# Patient Record
Sex: FEMALE | Race: BLACK OR AFRICAN AMERICAN | Employment: PART TIME | URBAN - METROPOLITAN AREA
[De-identification: names, ages, dates, MRNs, and addresses within clinical notes are randomized per-mention and may not be internally consistent; named-entity substitution may affect disease eponyms.]

---

## 2017-01-03 ENCOUNTER — OFFICE VISIT (OUTPATIENT)
Dept: ORTHOPEDIC SURGERY | Age: 27
End: 2017-01-03

## 2017-01-03 VITALS
HEIGHT: 64 IN | RESPIRATION RATE: 16 BRPM | BODY MASS INDEX: 32.95 KG/M2 | DIASTOLIC BLOOD PRESSURE: 85 MMHG | SYSTOLIC BLOOD PRESSURE: 130 MMHG | WEIGHT: 193 LBS | TEMPERATURE: 98 F | HEART RATE: 90 BPM | OXYGEN SATURATION: 98 %

## 2017-01-03 DIAGNOSIS — M99.03 LUMBAR REGION SOMATIC DYSFUNCTION: ICD-10-CM

## 2017-01-03 DIAGNOSIS — G89.29 CHRONIC LEFT-SIDED LOW BACK PAIN WITHOUT SCIATICA: Primary | ICD-10-CM

## 2017-01-03 DIAGNOSIS — M54.42 ACUTE BILATERAL LOW BACK PAIN WITH LEFT-SIDED SCIATICA: ICD-10-CM

## 2017-01-03 DIAGNOSIS — M99.04 SACRAL REGION SOMATIC DYSFUNCTION: ICD-10-CM

## 2017-01-03 DIAGNOSIS — M54.50 CHRONIC LEFT-SIDED LOW BACK PAIN WITHOUT SCIATICA: Primary | ICD-10-CM

## 2017-01-03 DIAGNOSIS — M99.05 PELVIC SOMATIC DYSFUNCTION: ICD-10-CM

## 2017-01-03 RX ORDER — CYCLOBENZAPRINE HCL 5 MG
TABLET ORAL
Qty: 15 TAB | Refills: 0 | Status: SHIPPED | OUTPATIENT
Start: 2017-01-03 | End: 2017-01-03 | Stop reason: SDUPTHER

## 2017-01-03 RX ORDER — CYCLOBENZAPRINE HCL 5 MG
TABLET ORAL
Qty: 90 TAB | Refills: 0 | Status: SHIPPED | OUTPATIENT
Start: 2017-01-03 | End: 2017-07-18

## 2017-01-03 RX ORDER — MELOXICAM 7.5 MG/1
TABLET ORAL
Qty: 30 TAB | Refills: 0 | Status: SHIPPED | OUTPATIENT
Start: 2017-01-03 | End: 2017-01-03 | Stop reason: SDUPTHER

## 2017-01-03 RX ORDER — HYDROCODONE BITARTRATE AND ACETAMINOPHEN 5; 325 MG/1; MG/1
1 TABLET ORAL
Qty: 30 TAB | Refills: 0 | Status: SHIPPED | OUTPATIENT
Start: 2017-01-03 | End: 2017-11-20

## 2017-01-03 RX ORDER — MELOXICAM 7.5 MG/1
TABLET ORAL
Qty: 30 TAB | Refills: 0 | Status: SHIPPED | OUTPATIENT
Start: 2017-01-03 | End: 2017-01-26 | Stop reason: SDUPTHER

## 2017-01-03 NOTE — PROGRESS NOTES
1. Have you been to the ER, urgent care clinic since your last visit? Hospitalized since your last visit? new to  practice    2. Have you seen or consulted any other health care providers outside of the [address] [place] [address] since your last visit? Include any pap smears or colon screening.   new to  practice

## 2017-01-03 NOTE — MR AVS SNAPSHOT
Visit Information Date & Time Provider Department Dept. Phone Encounter #  
 1/3/2017  3:40 PM Andrea Raygoza, [address] [phone] [id number] Follow-up Instructions Return in about 6 weeks (around 2/14/2017) for lumbago. Upcoming Health Maintenance Date Due DTaP/Tdap/Td series (1 - Tdap) 4/9/2011 HPV AGE 9Y-26Y (2 of 3 - Female 3 Dose Series) 5/18/2050* PAP AKA CERVICAL CYTOLOGY 1/27/2018 *Topic was postponed. The date shown is not the original due date. Allergies as of 1/3/2017  Review Complete On: 1/3/2017 By: Andrea Raygoza, DO No Known Allergies Current Immunizations  Reviewed on 4/20/2016 Name Date Influenza Vaccine PF 10/3/2016 Not reviewed this visit You Were Diagnosed With   
  
 Codes Comments Chronic left-sided low back pain without sciatica    -  Primary ICD-10-CM: M54.5, G89.29 ICD-9-CM: 724.2, 338.29 Lumbar region somatic dysfunction     ICD-10-CM: M99.03 
ICD-9-CM: 739.3 Pelvic somatic dysfunction     ICD-10-CM: M99.05 
ICD-9-CM: 739.5 Sacral region somatic dysfunction     ICD-10-CM: M99.04 
ICD-9-CM: 739.4 Vitals BP Pulse Temp Resp Height(growth percentile) Weight(growth percentile) 130/85 (BP 1 Location: Right arm, BP Patient Position: Sitting) 90 98 °F (36.7 °C) (Oral) 16 5' 4\" (1.626 m) 193 lb (87.5 kg) SpO2 BMI OB Status Smoking Status 98% 33.13 kg/m2 Injection Never Smoker Vitals History BMI and BSA Data Body Mass Index Body Surface Area  
 33.13 kg/m 2 1.99 m 2 Preferred Pharmacy Pharmacy Name Phone [facility][id number]- [address] [phone] Your Updated Medication List  
  
   
This list is accurate as of: 1/3/17  3:44 PM.  Always use your most recent med list.  
  
  
  
  
 butalbital-acetaminophen-caffeine -40 mg per tablet Commonly known as:  Julio Paul  
 Take 1 Tab by mouth every eight (8) hours as needed for Headache. Max Daily Amount: 3 Tabs. cyclobenzaprine 5 mg tablet Commonly known as:  FLEXERIL Take one po qhs prn muscle spasms HYDROcodone-acetaminophen 5-325 mg per tablet Commonly known as:  Chanda Luis Take 1 Tab by mouth every eight (8) hours as needed for Pain. Max Daily Amount: 3 Tabs.  
  
 loratadine 10 mg tablet Commonly known as:  Benoit Sor Take 1 Tab by mouth daily. meloxicam 7.5 mg tablet Commonly known as:  MOBIC Take 1 po twice daily prn pain  
  
 rizatriptan 5 mg tablet Commonly known as:  Verenice Prier Take one po at onset of headaches and May repeat in 2 hours if needed for maximum of 4 in 24 hrs  
  
 sertraline 25 mg tablet Commonly known as:  ZOLOFT Take 1 Tab by mouth daily. Prescriptions Printed Refills HYDROcodone-acetaminophen (NORCO) 5-325 mg per tablet 0 Sig: Take 1 Tab by mouth every eight (8) hours as needed for Pain. Max Daily Amount: 3 Tabs. Class: Print Route: Oral  
  
Prescriptions Sent to Pharmacy Refills  
 meloxicam (MOBIC) 7.5 mg tablet 0 Sig: Take 1 po twice daily prn pain  
 Class: Normal  
 Pharmacy: [facility] Ph #: [phone] cyclobenzaprine (FLEXERIL) 5 mg tablet 0 Sig: Take one po qhs prn muscle spasms Class: Normal  
 Pharmacy: [address] [place] [address] Ph #: [phone] Follow-up Instructions Return in about 6 weeks (around 2/14/2017) for lumbago. Referral Information Referral ID Referred By Referred To  
  
 [id number] [facility] [address] [place] [address], [address] Phone: [phone] Visits Status Start Date End Date 1 New Request 1/3/17 1/3/18  If your referral has a status of pending review or denied, additional information will be sent to support the outcome of this decision. Patient Instructions Back Pain: Care Instructions Your Care Instructions Back pain has many possible causes. It is often related to problems with muscles and ligaments of the back. It may also be related to problems with the nerves, discs, or bones of the back. Moving, lifting, standing, sitting, or sleeping in an awkward way can strain the back. Sometimes you don't notice the injury until later. Arthritis is another common cause of back pain. Although it may hurt a lot, back pain usually improves on its own within several weeks. Most people recover in 12 weeks or less. Using good home treatment and being careful not to stress your back can help you feel better sooner. Follow-up care is a key part of your treatment and safety. Be sure to make and go to all appointments, and call your doctor if you are having problems. Its also a good idea to know your test results and keep a list of the medicines you take. How can you care for yourself at home? · Sit or lie in positions that are most comfortable and reduce your pain. Try one of these positions when you lie down: ¨ Lie on your back with your knees bent and supported by large pillows. ¨ Lie on the floor with your legs on the seat of a sofa or chair. Berhane Hutton on your side with your knees and hips bent and a pillow between your legs. ¨ Lie on your stomach if it does not make pain worse. · Do not sit up in bed, and avoid soft couches and twisted positions. Bed rest can help relieve pain at first, but it delays healing. Avoid bed rest after the first day of back pain. · Change positions every 30 minutes. If you must sit for long periods of time, take breaks from sitting. Get up and walk around, or lie in a comfortable position. · Try using a heating pad on a low or medium setting for 15 to 20 minutes every 2 or 3 hours.  Try a warm shower in place of one session with the heating pad. · You can also try an ice pack for 10 to 15 minutes every 2 to 3 hours. Put a thin cloth between the ice pack and your skin. · Take pain medicines exactly as directed. ¨ If the doctor gave you a prescription medicine for pain, take it as prescribed. ¨ If you are not taking a prescription pain medicine, ask your doctor if you can take an over-the-counter medicine. · Take short walks several times a day. You can start with 5 to 10 minutes, 3 or 4 times a day, and work up to longer walks. Walk on level surfaces and avoid hills and stairs until your back is better. · Return to work and other activities as soon as you can. Continued rest without activity is usually not good for your back. · To prevent future back pain, do exercises to stretch and strengthen your back and stomach. Learn how to use good posture, safe lifting techniques, and proper body mechanics. When should you call for help? Call your doctor now or seek immediate medical care if: 
· You have new or worsening numbness in your legs. · You have new or worsening weakness in your legs. (This could make it hard to stand up.) · You lose control of your bladder or bowels. Watch closely for changes in your health, and be sure to contact your doctor if: 
· Your pain gets worse. · You are not getting better after 2 weeks. Where can you learn more? Go to http://adela-navdeep.info/. Enter N385 in the search box to learn more about \"Back Pain: Care Instructions. \" Current as of: May 23, 2016 Content Version: 11.1 © 9556-6125 [employer]. Care instructions adapted under license by [employer] (which disclaims liability or warranty for this information). If you have questions about a medical condition or this instruction, always ask your healthcare professional. [place] [address] any warranty or liability for your use of this information. Introducing [facility]! Bharath Chauhan introduces Placeling patient portal. Now you can access parts of your medical record, email your doctor's office, and request medication refills online. 1. In your internet browser, go to [email or web address]. [email or web address] 2. Click on the First Time User? Click Here link in the Sign In box. You will see the New Member Sign Up page. 3. Enter your Placeling Access Code exactly as it appears below. You will not need to use this code after youve completed the sign-up process. If you do not sign up before the expiration date, you must request a new code. · Placeling Access Code: [email or web address] Expires: 4/3/2017  3:11 PM 
 
4. Enter the last four digits of your Social Security Number (xxxx) and Date of Birth (mm/dd/yyyy) as indicated and click Submit. You will be taken to the next sign-up page. 5. Create a Placeling ID. This will be your Placeling login ID and cannot be changed, so think of one that is secure and easy to remember. 6. Create a Placeling password. You can change your password at any time. 7. Enter your Password Reset Question and Answer. This can be used at a later time if you forget your password. 8. Enter your e-mail address. You will receive e-mail notification when new information is available in [address] 9. Click Sign Up. You can now view and download portions of your medical record. 10. Click the Download Summary menu link to download a portable copy of your medical information. If you have questions, please visit the Frequently Asked Questions section of the Placeling website. Remember, Placeling is NOT to be used for urgent needs. For medical emergencies, dial 911. Now available from your iPhone and Android! Please provide this summary of care documentation to your next provider. Your primary care clinician is listed as Ricardo Latif. If you have any questions after today's visit, please call [phone].
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
## 2017-01-03 NOTE — TELEPHONE ENCOUNTER
Requested Prescriptions     Pending Prescriptions Disp Refills    cyclobenzaprine (FLEXERIL) 5 mg tablet 15 Tab 0     Sig: Take one po qhs prn muscle spasms    meloxicam (MOBIC) 7.5 mg tablet 30 Tab 0     Sig: Take 1-2 po daily prn pain

## 2017-01-03 NOTE — PATIENT INSTRUCTIONS
Back Pain: Care Instructions  Your Care Instructions    Back pain has many possible causes. It is often related to problems with muscles and ligaments of the back. It may also be related to problems with the nerves, discs, or bones of the back. Moving, lifting, standing, sitting, or sleeping in an awkward way can strain the back. Sometimes you don't notice the injury until later. Arthritis is another common cause of back pain. Although it may hurt a lot, back pain usually improves on its own within several weeks. Most people recover in 12 weeks or less. Using good home treatment and being careful not to stress your back can help you feel better sooner. Follow-up care is a key part of your treatment and safety. Be sure to make and go to all appointments, and call your doctor if you are having problems. Its also a good idea to know your test results and keep a list of the medicines you take. How can you care for yourself at home? · Sit or lie in positions that are most comfortable and reduce your pain. Try one of these positions when you lie down:  ¨ Lie on your back with your knees bent and supported by large pillows. ¨ Lie on the floor with your legs on the seat of a sofa or chair. Phill Maw on your side with your knees and hips bent and a pillow between your legs. ¨ Lie on your stomach if it does not make pain worse. · Do not sit up in bed, and avoid soft couches and twisted positions. Bed rest can help relieve pain at first, but it delays healing. Avoid bed rest after the first day of back pain. · Change positions every 30 minutes. If you must sit for long periods of time, take breaks from sitting. Get up and walk around, or lie in a comfortable position. · Try using a heating pad on a low or medium setting for 15 to 20 minutes every 2 or 3 hours. Try a warm shower in place of one session with the heating pad. · You can also try an ice pack for 10 to 15 minutes every 2 to 3 hours.  Put a thin cloth between the ice pack and your skin. · Take pain medicines exactly as directed. ¨ If the doctor gave you a prescription medicine for pain, take it as prescribed. ¨ If you are not taking a prescription pain medicine, ask your doctor if you can take an over-the-counter medicine. · Take short walks several times a day. You can start with 5 to 10 minutes, 3 or 4 times a day, and work up to longer walks. Walk on level surfaces and avoid hills and stairs until your back is better. · Return to work and other activities as soon as you can. Continued rest without activity is usually not good for your back. · To prevent future back pain, do exercises to stretch and strengthen your back and stomach. Learn how to use good posture, safe lifting techniques, and proper body mechanics. When should you call for help? Call your doctor now or seek immediate medical care if:  · You have new or worsening numbness in your legs. · You have new or worsening weakness in your legs. (This could make it hard to stand up.)  · You lose control of your bladder or bowels. Watch closely for changes in your health, and be sure to contact your doctor if:  · Your pain gets worse. · You are not getting better after 2 weeks. Where can you learn more? Go to http://adela-navdeep.info/. Enter H858 in the search box to learn more about \"Back Pain: Care Instructions. \"  Current as of: May 23, 2016  Content Version: 11.1  © 4469-3301 [employer], Incorporated. Care instructions adapted under license by [employer] (which disclaims liability or warranty for this information). If you have questions about a medical condition or this instruction, always ask your healthcare professional. [address] any warranty or liability for your use of this information.

## 2017-05-09 DIAGNOSIS — F41.9 ANXIETY: ICD-10-CM

## 2017-05-09 RX ORDER — SERTRALINE HYDROCHLORIDE 25 MG/1
25 TABLET, FILM COATED ORAL DAILY
Qty: 30 TAB | Refills: 0 | Status: SHIPPED | OUTPATIENT
Start: 2017-05-09 | End: 2017-07-18 | Stop reason: SDUPTHER

## 2017-05-09 NOTE — TELEPHONE ENCOUNTER
Requested Prescriptions     Pending Prescriptions Disp Refills    sertraline (ZOLOFT) 25 mg tablet 90 Tab 3     Sig: Take 1 Tab by mouth daily.

## 2017-05-09 NOTE — TELEPHONE ENCOUNTER
Sent electronically:    Requested Prescriptions     Signed Prescriptions Disp Refills    sertraline (ZOLOFT) 25 mg tablet 30 Tab 0     Sig: Take 1 Tab by mouth daily. Authorizing Provider: Roxanne Solorzano     Pt needs to make a f/u appt. With me if she wants more refills.

## 2017-06-23 DIAGNOSIS — F41.9 ANXIETY: ICD-10-CM

## 2017-06-25 RX ORDER — SERTRALINE HYDROCHLORIDE 25 MG/1
TABLET, FILM COATED ORAL
Qty: 30 TAB | Refills: 0 | OUTPATIENT
Start: 2017-06-25

## 2017-06-25 NOTE — TELEPHONE ENCOUNTER
Denied:    Requested Prescriptions     Refused Prescriptions Disp Refills    sertraline (ZOLOFT) 25 mg tablet [Pharmacy Med Name: SERTRALINE 25MG     TAB] 30 Tab 0     Sig: TAKE ONE TABLET BY MOUTH ONCE DAILY     Refused By: Maximo Finder     Reason for Refusal: Appt required, please call patient     Last OV with me was 12/2016. She needs to be seen.

## 2017-06-26 NOTE — TELEPHONE ENCOUNTER
Spoke with patient, confirmed name and . Advised patient of required appointment, per Dr. Ariel Ingram. Patient verbalized understanding, scheduled an appointment for 2017 at 945.      Be advised

## 2017-07-18 ENCOUNTER — OFFICE VISIT (OUTPATIENT)
Dept: INTERNAL MEDICINE CLINIC | Age: 27
End: 2017-07-18

## 2017-07-18 VITALS
WEIGHT: 207.4 LBS | SYSTOLIC BLOOD PRESSURE: 120 MMHG | HEART RATE: 88 BPM | RESPIRATION RATE: 16 BRPM | DIASTOLIC BLOOD PRESSURE: 80 MMHG | OXYGEN SATURATION: 100 % | HEIGHT: 64 IN | BODY MASS INDEX: 35.41 KG/M2 | TEMPERATURE: 97.5 F

## 2017-07-18 DIAGNOSIS — G89.29 CHRONIC LEFT-SIDED LOW BACK PAIN WITHOUT SCIATICA: ICD-10-CM

## 2017-07-18 DIAGNOSIS — R42 DIZZINESS: Primary | ICD-10-CM

## 2017-07-18 DIAGNOSIS — R09.81 NASAL CONGESTION: ICD-10-CM

## 2017-07-18 DIAGNOSIS — F41.9 ANXIETY: ICD-10-CM

## 2017-07-18 DIAGNOSIS — F32.A DEPRESSION, UNSPECIFIED DEPRESSION TYPE: ICD-10-CM

## 2017-07-18 DIAGNOSIS — M54.50 CHRONIC LEFT-SIDED LOW BACK PAIN WITHOUT SCIATICA: ICD-10-CM

## 2017-07-18 RX ORDER — CYCLOBENZAPRINE HCL 5 MG
TABLET ORAL
Qty: 90 TAB | Refills: 0 | Status: SHIPPED | OUTPATIENT
Start: 2017-07-18 | End: 2018-03-15 | Stop reason: SDUPTHER

## 2017-07-18 RX ORDER — FLUTICASONE PROPIONATE 50 MCG
1 SPRAY, SUSPENSION (ML) NASAL DAILY
Qty: 1 BOTTLE | Refills: 3 | Status: SHIPPED | OUTPATIENT
Start: 2017-07-18 | End: 2018-04-25

## 2017-07-18 RX ORDER — SERTRALINE HYDROCHLORIDE 25 MG/1
25 TABLET, FILM COATED ORAL DAILY
Qty: 90 TAB | Refills: 3 | Status: SHIPPED | OUTPATIENT
Start: 2017-07-18 | End: 2018-04-25

## 2017-07-18 NOTE — PATIENT INSTRUCTIONS
1) follow-up in 4 months or sooner if worsening symptoms. DR. Ludmila Quiroz    External Physician           Coleen GODINEZSE, [address], [facility], [address] [facility]       Primary Contact Information      Phone Fax E-mail Address     [phone] Not available. [address]       Dr. Artie Packer Psychiatry       Primary Contact Information      Phone Fax E-mail Address     [phone] Not available.  [address] [place] [address]

## 2017-07-18 NOTE — PROGRESS NOTES
ROOM # 1    Clyde Vergara presents today for   Chief Complaint   Patient presents with    Dizziness     dizziness and near syncope    Nasal Congestion     intermittent congestion and runny nose    Medication Refill     Requested Prescriptions     Pending Prescriptions Disp Refills    sertraline (ZOLOFT) 25 mg tablet 30 Tab 0     Sig: Take 1 Tab by mouth daily.  cyclobenzaprine (FLEXERIL) 5 mg tablet 90 Tab 0     Sig: Take one po qhs prn muscle spasms         Hannah Burt preferred language for health care discussion is english/other. Is someone accompanying this pt? no    Is the patient using any DME equipment during OV? no    Depression Screening:  PHQ over the last two weeks 4/20/2016 4/20/2016 4/9/2015   Little interest or pleasure in doing things Not at all Not at all Not at all   Feeling down, depressed or hopeless Not at all Not at all Not at all   Total Score PHQ 2 0 0 0       Learning Assessment:  Learning Assessment 6/10/2015 2/11/2015   PRIMARY LEARNER Patient Patient   HIGHEST LEVEL OF EDUCATION - PRIMARY LEARNER  - SOME COLLEGE   BARRIERS PRIMARY LEARNER - NONE   PRIMARY LANGUAGE ENGLISH ENGLISH   LEARNER PREFERENCE PRIMARY LISTENING DEMONSTRATION   ANSWERED BY patient Patient   RELATIONSHIP SELF SELF       Abuse Screening:  No flowsheet data found. Fall Risk  No flowsheet data found. Health Maintenance reviewed and discussed per provider. Yes    Clyde Vergara is due for TDAP vax. Please order/place referral if appropriate. Advance Directive:  1. Do you have an advance directive in place? Patient Reply: no    2. If not, would you like material regarding how to put one in place? Patient Reply: no    Coordination of Care:  1. Have you been to the ER, urgent care clinic since your last visit? Hospitalized since your last visit? 1 month ago [facility] urgent care for strep throat    2.  Have you seen or consulted any other health care providers outside of the [facility] since your last visit? Include any pap smears or colon screening.  no

## 2017-07-18 NOTE — LETTER
NOTIFICATION RETURN TO WORK / SCHOOL 
 
7/18/2017 10:31 AM 
 
Ms. Jen Nails [address] [facility] To Whom It May Concern: 
 
Jen Nails is currently under the care of Elle Bowens. She will return to work on 7/19/17. If there are questions or concerns please have the patient contact our office. Sincerely, Radha Guevara MD

## 2017-07-18 NOTE — MR AVS SNAPSHOT
Visit Information Date & Time Provider Department Dept. Phone Encounter #  
 7/18/2017 10:00 AM Melanie Sewell MD [address] [place] [address] [phone] [id number] Follow-up Instructions Return in about 4 months (around 11/18/2017) for f/u anxiety. Upcoming Health Maintenance Date Due DTaP/Tdap/Td series (1 - Tdap) 4/9/2011 INFLUENZA AGE 9 TO ADULT 8/1/2017 PAP AKA CERVICAL CYTOLOGY 1/27/2018 Allergies as of 7/18/2017  Review Complete On: 7/18/2017 By: Melanie Sewell MD  
 No Known Allergies Current Immunizations  Reviewed on 4/20/2016 Name Date Influenza Vaccine PF 10/3/2016 Not reviewed this visit You Were Diagnosed With   
  
 Codes Comments Dizziness    -  Primary ICD-10-CM: M21 ICD-9-CM: 780.4 Anxiety     ICD-10-CM: F41.9 ICD-9-CM: 300.00 Chronic left-sided low back pain without sciatica     ICD-10-CM: M54.5, G89.29 ICD-9-CM: 724.2, 338.29 Vitals BP Pulse Temp Resp Height(growth percentile) Weight(growth percentile) 120/80 (BP 1 Location: Left arm, BP Patient Position: Sitting) 88 97.5 °F (36.4 °C) (Oral) 16 5' 4\" (1.626 m) 207 lb 6.4 oz (94.1 kg) SpO2 BMI OB Status Smoking Status 100% 35.6 kg/m2 Injection Never Smoker Vitals History BMI and BSA Data Body Mass Index Body Surface Area  
 35.6 kg/m 2 2.06 m 2 Preferred Pharmacy Pharmacy Name Phone [address] [phone] Your Updated Medication List  
  
   
This list is accurate as of: 7/18/17 10:31 AM.  Always use your most recent med list.  
  
  
  
  
 butalbital-acetaminophen-caffeine -40 mg per tablet Commonly known as:  [place] Take 1 Tab by mouth every eight (8) hours as needed for Headache. Max Daily Amount: 3 Tabs. cyclobenzaprine 5 mg tablet Commonly known as:  FLEXERIL Take one po qhs prn muscle spasms HYDROcodone-acetaminophen 5-325 mg per tablet Commonly known as:  Shanthi Almodovar Take 1 Tab by mouth every eight (8) hours as needed for Pain. Max Daily Amount: 3 Tabs.  
  
 loratadine 10 mg tablet Commonly known as:  Bula Holms Take 1 Tab by mouth daily. meloxicam 7.5 mg tablet Commonly known as:  MOBIC Take 1 po twice daily prn pain  
  
 sertraline 25 mg tablet Commonly known as:  ZOLOFT Take 1 Tab by mouth daily. Prescriptions Sent to Pharmacy Refills  
 sertraline (ZOLOFT) 25 mg tablet 3 Sig: Take 1 Tab by mouth daily. Class: Normal  
 Pharmacy: [address] [place] [address] Ph #: [phone] Route: Oral  
 cyclobenzaprine (FLEXERIL) 5 mg tablet 0 Sig: Take one po qhs prn muscle spasms Class: Normal  
 Pharmacy: [address] [place] [address] Ph #: [phone] Follow-up Instructions Return in about 4 months (around 11/18/2017) for f/u anxiety. Patient Instructions 1) follow-up in 4 months or sooner if worsening symptoms. DR. Lorena Caicedo Physician  
  
  
  [facility]  
  [place], [address] [place] [address] Psychiatry  
   
Primary Contact Information   
  Phone Fax E-mail Address  
  [phone] Not available. [address] [place] [address]    
 
Dr. Stephanie Yoon Physician  
  
  
  Specialty  
  Psychiatry  
   
Primary Contact Information   
  Phone Fax E-mail Address  
  [phone] Not available. Camden Poe  
     
 
 
 
 
  
Introducing [facility]! Katie Schroeder introduces [employer] patient portal. Now you can access parts of your medical record, email your doctor's office, and request medication refills online. 1. In your internet browser, go to [email or web address] 2. Click on the First Time User? Click Here link in the Sign In box. You will see the New Member Sign Up page. 3. Enter your [employer] Access Code exactly as it appears below. You will not need to use this code after youve completed the sign-up process. If you do not sign up before the expiration date, you must request a new code. · [employer] Access Code: [email or web address] Expires: 10/16/2017 10:31 AM 
 
4. Enter the last four digits of your Social Security Number (xxxx) and Date of Birth (mm/dd/yyyy) as indicated and click Submit. You will be taken to the next sign-up page. 5. Create a [employer] ID. This will be your [employer] login ID and cannot be changed, so think of one that is secure and easy to remember. 6. Create a [employer] password. You can change your password at any time. 7. Enter your Password Reset Question and Answer. This can be used at a later time if you forget your password. 8. Enter your e-mail address. You will receive e-mail notification when new information is available in [address] 9. Click Sign Up. You can now view and download portions of your medical record. 10. Click the Download Summary menu link to download a portable copy of your medical information. If you have questions, please visit the Frequently Asked Questions section of the [employer] website. Remember, [employer] is NOT to be used for urgent needs. For medical emergencies, dial 911. Now available from your iPhone and Android! Please provide this summary of care documentation to your next provider. Your primary care clinician is listed as Ricardo Latif. If you have any questions after today's visit, please call [phone].

## 2017-07-18 NOTE — PROGRESS NOTES
Chief Complaint   Patient presents with    Dizziness     dizziness and near syncope    Nasal Congestion     intermittent congestion and runny nose    Medication Refill       HPI:     Janel Diamond is a 32 y.o.  female with history of anxiety and depression    here for the above complaint. She started having dizziness at 6:15  This AM. She felt clammy and felt like she was going to pass out. She was having palpitations and blurred vision, but no chest pain, shortness of breath, headaches, double vision, abdominal pain. She had eaten breakfast. Dizziness was not positional. She was walking around. She sat down and drank water. No dizziness since then. She has been having problems with sinuses. She has post nasal drainage  That is yellow or clear. No fevers, chills, night sweats. , cough. Past Medical History:   Diagnosis Date    Anxiety     Depression     Trichomonas vaginalis infection 8/26/2015    Vitamin B12 deficiency 5/2015    Vitamin D deficiency 5/2015     History reviewed. No pertinent surgical history. Current Outpatient Prescriptions   Medication Sig    sertraline (ZOLOFT) 25 mg tablet Take 1 Tab by mouth daily.  cyclobenzaprine (FLEXERIL) 5 mg tablet Take one po qhs prn muscle spasms    fluticasone (FLONASE) 50 mcg/actuation nasal spray 1 [place] by Both Nostrils route daily.  meloxicam (MOBIC) 7.5 mg tablet Take 1 po twice daily prn pain    HYDROcodone-acetaminophen (NORCO) 5-325 mg per tablet Take 1 Tab by mouth every eight (8) hours as needed for Pain. Max Daily Amount: 3 Tabs.  butalbital-acetaminophen-caffeine (FIORICET, ESGIC) -40 mg per tablet Take 1 Tab by mouth every eight (8) hours as needed for Headache. Max Daily Amount: 3 Tabs.  loratadine (CLARITIN) 10 mg tablet Take 1 Tab by mouth daily. (Patient taking differently: Take 10 mg by mouth daily as needed.)     No current facility-administered medications for this visit.       Health Maintenance   Topic Date Due    DTaP/Tdap/Td series (1 - Tdap) 04/09/2011    INFLUENZA AGE 9 TO ADULT  08/01/2017    PAP AKA CERVICAL CYTOLOGY  01/27/2018     Immunization History   Administered Date(s) Administered    Influenza Vaccine PF 10/03/2016     No LMP recorded. Patient has had an injection. Allergies and Intolerances:   No Known Allergies    Family History:   Family History   Problem Relation Age of Onset    Hypertension Mother     Migraines Mother     Bipolar Disorder Sister        Social History:   She  reports that she has never smoked. She has never used smokeless tobacco.  She  reports that she drinks alcohol. OBJECTIVE:   Physical exam:   Visit Vitals    /80 (BP 1 Location: Left arm, BP Patient Position: Sitting)    Pulse 88    Temp 97.5 °F (36.4 °C) (Oral)    Resp 16    Ht 5' 4\" (1.626 m)    Wt 207 lb 6.4 oz (94.1 kg)    LMP Comment: depo inj    SpO2 100%    BMI 35.6 kg/m2        Generally: Pleasant female in no acute distress  HEENT Exam:HEENT Exam: Head: atraumatic               Eyes: PERRLA    Ears: bilaterally normal TM, no erythema or exudate, normal light reflex    Nares: moist mucosa, no erythema    Mouth: Clear, no erythema or exudate    Neck: supple, no LAD, negative carotid bruits bilaterally. Cardiac Exam: regular, rate, and rhythm. Normal S1 and S2. No murmurs, gallops, or rubs  Pulmonary exam: Clear to auscultation bilaterally  Abdominal exam: Positive bowel sounds in all four quadrants, soft, nondistended, nontender  Extremities: 2+ dorsalis pedis pulses bilaterally.  No pedal edema    bilaterally    LABS/RADIOLOGICAL TESTS:  Lab Results   Component Value Date/Time    WBC 12.7 05/20/2015 09:54 AM    HGB 13.5 05/20/2015 09:54 AM    HCT 41.0 05/20/2015 09:54 AM    PLATELET 016 71/65/7531 09:54 AM     Lab Results   Component Value Date/Time    Sodium 141 05/20/2015 09:54 AM    Potassium 4.6 05/20/2015 09:54 AM    Chloride 103 05/20/2015 09:54 AM CO2 24 2015 09:54 AM    Glucose 80 2015 09:54 AM    BUN 7 2015 09:54 AM    Creatinine 0.7 2015 09:54 AM     No results found for: CHOL, CHOLX, CHLST, CHOLV, HDL, LDL, LDLC, DLDLP, TGLX, TRIGL, TRIGP  No results found for: GPT    Previous labs    ASSESSMENT/PLAN:    1. Dizziness: unknown etiology. This has resolved. She did not want to get lab work. Will monitor and if happens again will do work up. 2. Anxiety: stable on zoloft   -     sertraline (ZOLOFT) 25 mg tablet; Take 1 Tab by mouth daily. 3. Depression, unspecified depression type: stable on zoloft. 4. Chronic left-sided low back pain without sciatica  -     cyclobenzaprine (FLEXERIL) 5 mg tablet; Take one po qhs prn muscle spasms    5. Requested Prescriptions     Signed Prescriptions Disp Refills    sertraline (ZOLOFT) 25 mg tablet 90 Tab 3     Sig: Take 1 Tab by mouth daily.  cyclobenzaprine (FLEXERIL) 5 mg tablet 90 Tab 0     Sig: Take one po qhs prn muscle spasms    fluticasone (FLONASE) 50 mcg/actuation nasal spray 1 Bottle 3     Si [place] by Both Nostrils route daily. 6. Patient verbalized understanding and agreement with the plan. 7. Patient was given an after-visit summary. 8. Return to work letter to go back to work on 17. Follow-up Disposition:  Return in about 4 months (around 2017) for f/u anxiety. or sooner if worsening symptoms.           Sebastian Serra MD

## 2017-11-20 ENCOUNTER — OFFICE VISIT (OUTPATIENT)
Dept: INTERNAL MEDICINE CLINIC | Age: 27
End: 2017-11-20

## 2017-11-20 VITALS
RESPIRATION RATE: 18 BRPM | TEMPERATURE: 97.2 F | BODY MASS INDEX: 35.61 KG/M2 | SYSTOLIC BLOOD PRESSURE: 130 MMHG | HEIGHT: 64 IN | DIASTOLIC BLOOD PRESSURE: 80 MMHG | OXYGEN SATURATION: 100 % | HEART RATE: 93 BPM | WEIGHT: 208.6 LBS

## 2017-11-20 DIAGNOSIS — M79.651 RIGHT THIGH PAIN: Primary | ICD-10-CM

## 2017-11-20 DIAGNOSIS — F31.9 BIPOLAR AFFECTIVE DISORDER, REMISSION STATUS UNSPECIFIED (HCC): ICD-10-CM

## 2017-11-20 DIAGNOSIS — F41.9 ANXIETY: ICD-10-CM

## 2017-11-20 RX ORDER — IBUPROFEN 800 MG/1
800 TABLET ORAL
Qty: 60 TAB | Refills: 0 | Status: SHIPPED | OUTPATIENT
Start: 2017-11-20 | End: 2018-04-25

## 2017-11-20 NOTE — PATIENT INSTRUCTIONS
1) d/c mobic and will try ibuprofen. 2) Take ibuprofen  With food. If you notice any blood/black tarry stools, diarrhea, abdominal pain, nausea, vomiting then stop medication immediately. Give us a call ASAP. 3) use heating pad, icy/hot, tiger balm for pain. 4) follow-up as needed or sooner if worsening symptoms. 5) don't take ibuprofen at same time as zoloft. Give 6-8 hrs in between.

## 2017-11-20 NOTE — PROGRESS NOTES
ROOM # 1    Dawna Ashton presents today for   Chief Complaint   Patient presents with    Leg Pain     right thigh pain/swelling, no apparent injury, neg calf pain    Anxiety     f/u. Pt. now has psych following       Dawna Ashton preferred language for health care discussion is english/other. Is someone accompanying this pt? no    Is the patient using any DME equipment during OV? no    Depression Screening:  PHQ over the last two weeks 4/20/2016 4/20/2016 4/9/2015   Little interest or pleasure in doing things Not at all Not at all Not at all   Feeling down, depressed or hopeless Not at all Not at all Not at all   Total Score PHQ 2 0 0 0       Learning Assessment:  Learning Assessment 6/10/2015 2/11/2015   PRIMARY LEARNER Patient Patient   HIGHEST LEVEL OF EDUCATION - PRIMARY LEARNER  - SOME COLLEGE   BARRIERS PRIMARY LEARNER - NONE   PRIMARY LANGUAGE ENGLISH ENGLISH   LEARNER PREFERENCE PRIMARY LISTENING DEMONSTRATION   ANSWERED BY patient Patient   RELATIONSHIP SELF SELF       Abuse Screening:  No flowsheet data found. Fall Risk  No flowsheet data found. Health Maintenance reviewed and discussed per provider. Yes    Dawna Ashton is due for influenza vax. Please order/place referral if appropriate. Advance Directive:  1. Do you have an advance directive in place? Patient Reply: no    2. If not, would you like material regarding how to put one in place? Patient Reply: no    Coordination of Care:  1. Have you been to the ER, urgent care clinic since your last visit? Hospitalized since your last visit? [facility] urgent care for above    2. Have you seen or consulted any other health care providers outside of the [address] [place] [address] since your last visit? Include any pap smears or colon screening.  no

## 2017-11-20 NOTE — LETTER
NOTIFICATION RETURN TO WORK / SCHOOL 
 
11/20/2017 11:03 AM 
 
Ms. Jack Ozuna [address] [facility] To Whom It May Concern: 
 
Jack Ozuna is currently under the care of Elle Bowens. She will return to work on 11/20/17. If there are questions or concerns please have the patient contact our office. Sincerely, Jojo Harris MD

## 2017-11-20 NOTE — MR AVS SNAPSHOT
Visit Information Date & Time Provider Department Dept. Phone Encounter #  
 11/20/2017 10:30 AM Juan M Molina MD [address] [phone] [id number] Follow-up Instructions Return if symptoms worsen or fail to improve. Upcoming Health Maintenance Date Due Influenza Age 5 to Adult 8/1/2017 PAP AKA CERVICAL CYTOLOGY 1/27/2018 DTaP/Tdap/Td series (2 - Td) 3/8/2022 Allergies as of 11/20/2017  Review Complete On: 11/20/2017 By: Juan M Molina MD  
  
 Severity Noted Reaction Type Reactions Amoxicillin  07/26/2017    Itching Oxycodone-acetaminophen  07/26/2017    Itching Current Immunizations  Reviewed on 4/20/2016 Name Date Influenza Vaccine PF 10/3/2016 MMR 3/10/2012 12:00 AM  
 Tdap 3/8/2012 12:00 AM  
  
 Not reviewed this visit You Were Diagnosed With   
  
 Codes Comments Right thigh pain    -  Primary ICD-10-CM: T59.511 ICD-9-CM: 729.5 Vitals BP Pulse Temp Resp Height(growth percentile) Weight(growth percentile) 130/80 (BP 1 Location: Left arm, BP Patient Position: Sitting) 93 97.2 °F (36.2 °C) (Oral) 18 5' 4\" (1.626 m) 208 lb 9.6 oz (94.6 kg) SpO2 BMI OB Status Smoking Status 100% 35.81 kg/m2 Injection Never Smoker Vitals History BMI and BSA Data Body Mass Index Body Surface Area  
 35.81 kg/m 2 2.07 m 2 Preferred Pharmacy Pharmacy Name Phone [address] [phone] Your Updated Medication List  
  
   
This list is accurate as of: 11/20/17 11:01 AM.  Always use your most recent med list.  
  
  
  
  
 butalbital-acetaminophen-caffeine -40 mg per tablet Commonly known as:  [place]arleajavy Memos Take 1 Tab by mouth every eight (8) hours as needed for Headache. Max Daily Amount: 3 Tabs. cyclobenzaprine 5 mg tablet Commonly known as:  FLEXERIL  
 Take one po qhs prn muscle spasms DEPO-PROVERA IM Inject  into the muscle. fluticasone 50 mcg/actuation nasal spray Commonly known as:  FLONASE  
1 [place] by Both Nostrils route daily. ibuprofen 800 mg tablet Commonly known as:  MOTRIN Take 1 Tab by mouth two (2) times daily as needed for Pain.  
  
 loratadine 10 mg tablet Commonly known as:  Sandy Prachi Take 1 Tab by mouth daily. sertraline 25 mg tablet Commonly known as:  ZOLOFT Take 1 Tab by mouth daily. TRAZAMINE PO Take  by mouth nightly. Prescriptions Sent to Pharmacy Refills  
 ibuprofen (MOTRIN) 800 mg tablet 0 Sig: Take 1 Tab by mouth two (2) times daily as needed for Pain. Class: Normal  
 Pharmacy: [address] [place] [address], [address] [place] [address] #: [phone] Route: Oral  
  
Follow-up Instructions Return if symptoms worsen or fail to improve. To-Do List   
 11/20/2017 Imaging:  XR FEMUR RT 2 VS   
  
  
Patient Instructions 1) d/c mobic and will try ibuprofen. 2) Take ibuprofen  With food. If you notice any blood/black tarry stools, diarrhea, abdominal pain, nausea, vomiting then stop medication immediately. Give us a call ASAP. 3) use heating pad, icy/hot, tiger balm for pain. 4) follow-up as needed or sooner if worsening symptoms. 5) don't take ibuprofen at same time as zoloft. Give 6-8 hrs in between. Introducing [facility]! Marni Abdi introduces [employer] patient portal. Now you can access parts of your medical record, email your doctor's office, and request medication refills online. 1. In your internet browser, go to [email or web address] 2. Click on the First Time User? Click Here link in the Sign In box. You will see the New Member Sign Up page. 3. Enter your [email or web address] Access Code exactly as it appears below.  You will not need to use this code after youve completed the sign-up process. If you do not sign up before the expiration date, you must request a new code. · [employer] Access Code: [email or web address] Expires: 2/18/2018 11:01 AM 
 
4. Enter the last four digits of your Social Security Number (xxxx) and Date of Birth (mm/dd/yyyy) as indicated and click Submit. You will be taken to the next sign-up page. 5. Create a [employer] ID. This will be your [employer] login ID and cannot be changed, so think of one that is secure and easy to remember. 6. Create a [employer] password. You can change your password at any time. 7. Enter your Password Reset Question and Answer. This can be used at a later time if you forget your password. 8. Enter your e-mail address. You will receive e-mail notification when new information is available in [address] 9. Click Sign Up. You can now view and download portions of your medical record. 10. Click the Download Summary menu link to download a portable copy of your medical information. If you have questions, please visit the Frequently Asked Questions section of the [employer] website. Remember, [employer] is NOT to be used for urgent needs. For medical emergencies, dial 911. Now available from your iPhone and Android! Please provide this summary of care documentation to your next provider. Your primary care clinician is listed as Ricardo Latif. If you have any questions after today's visit, please call [phone].

## 2017-11-21 ENCOUNTER — TELEPHONE (OUTPATIENT)
Dept: INTERNAL MEDICINE CLINIC | Age: 27
End: 2017-11-21

## 2017-11-21 NOTE — TELEPHONE ENCOUNTER
----- Message from Jonah Hutton LPN sent at  11:11 AM EST -----  Call made to pt, both name and  verified. Pt was advised that x-ray results were negative. Pt states she is starting to feel much better today and that the Motrin is helping. I advised that I would forward this to Dr Charlton Carrel. Pt had no further questions or concerns        Notes Recorded by Yudith Joiner MD on 2017 at 3:32 PM  1) Please let pt know that x-ray was negative. 2) How is her thigh pain?

## 2018-03-15 DIAGNOSIS — G89.29 CHRONIC LEFT-SIDED LOW BACK PAIN WITHOUT SCIATICA: ICD-10-CM

## 2018-03-15 DIAGNOSIS — M54.50 CHRONIC LEFT-SIDED LOW BACK PAIN WITHOUT SCIATICA: ICD-10-CM

## 2018-03-15 RX ORDER — CYCLOBENZAPRINE HCL 5 MG
TABLET ORAL
Qty: 90 TAB | Refills: 0 | Status: SHIPPED | OUTPATIENT
Start: 2018-03-15 | End: 2018-04-25

## 2018-04-25 ENCOUNTER — HOSPITAL ENCOUNTER (EMERGENCY)
Age: 28
Discharge: PSYCHIATRIC OR SUBSTANCE USE FACILITY | End: 2018-04-26
Attending: EMERGENCY MEDICINE
Payer: SELF-PAY

## 2018-04-25 DIAGNOSIS — N39.0 ACUTE UTI: ICD-10-CM

## 2018-04-25 DIAGNOSIS — R45.851 SUICIDAL IDEATIONS: Primary | ICD-10-CM

## 2018-04-25 LAB
ALBUMIN SERPL-MCNC: 3.5 G/DL (ref 3.4–5)
ALBUMIN/GLOB SERPL: 0.8 {RATIO} (ref 0.8–1.7)
ALP SERPL-CCNC: 121 U/L (ref 45–117)
ALT SERPL-CCNC: 25 U/L (ref 13–56)
AMPHET UR QL SCN: NEGATIVE
ANION GAP SERPL CALC-SCNC: 8 MMOL/L (ref 3–18)
APPEARANCE UR: ABNORMAL
AST SERPL-CCNC: 27 U/L (ref 15–37)
BACTERIA URNS QL MICRO: ABNORMAL /HPF
BARBITURATES UR QL SCN: NEGATIVE
BASOPHILS # BLD: 0 K/UL (ref 0–0.1)
BASOPHILS NFR BLD: 0 % (ref 0–3)
BENZODIAZ UR QL: NEGATIVE
BILIRUB SERPL-MCNC: 0.6 MG/DL (ref 0.2–1)
BILIRUB UR QL: NEGATIVE
BUN SERPL-MCNC: 5 MG/DL (ref 7–18)
BUN/CREAT SERPL: 6 (ref 12–20)
CALCIUM SERPL-MCNC: 8.6 MG/DL (ref 8.5–10.1)
CANNABINOIDS UR QL SCN: POSITIVE
CHLORIDE SERPL-SCNC: 104 MMOL/L (ref 100–108)
CO2 SERPL-SCNC: 27 MMOL/L (ref 21–32)
COCAINE UR QL SCN: NEGATIVE
COLOR UR: ABNORMAL
CREAT SERPL-MCNC: 0.83 MG/DL (ref 0.6–1.3)
DIFFERENTIAL METHOD BLD: ABNORMAL
EOSINOPHIL # BLD: 0 K/UL (ref 0–0.4)
EOSINOPHIL NFR BLD: 0 % (ref 0–5)
EPITH CASTS URNS QL MICRO: ABNORMAL /LPF (ref 0–5)
ERYTHROCYTE [DISTWIDTH] IN BLOOD BY AUTOMATED COUNT: 17 % (ref 11.6–14.5)
ETHANOL SERPL-MCNC: <3 MG/DL (ref 0–3)
GLOBULIN SER CALC-MCNC: 4.2 G/DL (ref 2–4)
GLUCOSE SERPL-MCNC: 81 MG/DL (ref 74–99)
GLUCOSE UR STRIP.AUTO-MCNC: NEGATIVE MG/DL
HCG UR QL: NEGATIVE
HCT VFR BLD AUTO: 43.7 % (ref 35–45)
HDSCOM,HDSCOM: ABNORMAL
HGB BLD-MCNC: 15 G/DL (ref 12–16)
HGB UR QL STRIP: NEGATIVE
KETONES UR QL STRIP.AUTO: ABNORMAL MG/DL
LEUKOCYTE ESTERASE UR QL STRIP.AUTO: ABNORMAL
LITHIUM SERPL-SCNC: <0.2 MMOL/L (ref 0.6–1.2)
LYMPHOCYTES # BLD: 3.5 K/UL (ref 0.8–3.5)
LYMPHOCYTES NFR BLD: 36 % (ref 20–51)
MCH RBC QN AUTO: 25.9 PG (ref 24–34)
MCHC RBC AUTO-ENTMCNC: 34.3 G/DL (ref 31–37)
MCV RBC AUTO: 75.5 FL (ref 74–97)
METHADONE UR QL: NEGATIVE
MONOCYTES # BLD: 1 K/UL (ref 0–1)
MONOCYTES NFR BLD: 10 % (ref 2–9)
NEUTS SEG # BLD: 5.2 K/UL (ref 1.8–8)
NEUTS SEG NFR BLD: 54 % (ref 42–75)
NITRITE UR QL STRIP.AUTO: POSITIVE
OPIATES UR QL: NEGATIVE
PCP UR QL: NEGATIVE
PH UR STRIP: 5.5 [PH] (ref 5–8)
PLATELET # BLD AUTO: 194 K/UL (ref 135–420)
POTASSIUM SERPL-SCNC: 3.9 MMOL/L (ref 3.5–5.5)
PROT SERPL-MCNC: 7.7 G/DL (ref 6.4–8.2)
PROT UR STRIP-MCNC: NEGATIVE MG/DL
RBC # BLD AUTO: 5.79 M/UL (ref 4.2–5.3)
RBC #/AREA URNS HPF: NEGATIVE /HPF (ref 0–5)
RBC MORPH BLD: ABNORMAL
SODIUM SERPL-SCNC: 139 MMOL/L (ref 136–145)
SP GR UR REFRACTOMETRY: 1.02 (ref 1–1.03)
TSH SERPL DL<=0.05 MIU/L-ACNC: 0.53 UIU/ML (ref 0.36–3.74)
UROBILINOGEN UR QL STRIP.AUTO: 2 EU/DL (ref 0.2–1)
WBC # BLD AUTO: 9.7 K/UL (ref 4.6–13.2)
WBC MORPH BLD: ABNORMAL
WBC URNS QL MICRO: ABNORMAL /HPF (ref 0–4)

## 2018-04-25 PROCEDURE — 74011250636 HC RX REV CODE- 250/636: Performed by: EMERGENCY MEDICINE

## 2018-04-25 PROCEDURE — 81001 URINALYSIS AUTO W/SCOPE: CPT | Performed by: EMERGENCY MEDICINE

## 2018-04-25 PROCEDURE — 80053 COMPREHEN METABOLIC PANEL: CPT | Performed by: EMERGENCY MEDICINE

## 2018-04-25 PROCEDURE — 84443 ASSAY THYROID STIM HORMONE: CPT | Performed by: EMERGENCY MEDICINE

## 2018-04-25 PROCEDURE — 80307 DRUG TEST PRSMV CHEM ANLYZR: CPT | Performed by: EMERGENCY MEDICINE

## 2018-04-25 PROCEDURE — 81025 URINE PREGNANCY TEST: CPT | Performed by: EMERGENCY MEDICINE

## 2018-04-25 PROCEDURE — 74011250637 HC RX REV CODE- 250/637: Performed by: EMERGENCY MEDICINE

## 2018-04-25 PROCEDURE — 80178 ASSAY OF LITHIUM: CPT | Performed by: EMERGENCY MEDICINE

## 2018-04-25 PROCEDURE — 96361 HYDRATE IV INFUSION ADD-ON: CPT

## 2018-04-25 PROCEDURE — 85025 COMPLETE CBC W/AUTO DIFF WBC: CPT | Performed by: EMERGENCY MEDICINE

## 2018-04-25 PROCEDURE — 87077 CULTURE AEROBIC IDENTIFY: CPT | Performed by: EMERGENCY MEDICINE

## 2018-04-25 PROCEDURE — 99285 EMERGENCY DEPT VISIT HI MDM: CPT

## 2018-04-25 PROCEDURE — 87186 SC STD MICRODIL/AGAR DIL: CPT | Performed by: EMERGENCY MEDICINE

## 2018-04-25 PROCEDURE — 87086 URINE CULTURE/COLONY COUNT: CPT | Performed by: EMERGENCY MEDICINE

## 2018-04-25 RX ORDER — LITHIUM CARBONATE 300 MG
300 TABLET ORAL 3 TIMES DAILY
Status: DISCONTINUED | OUTPATIENT
Start: 2018-04-25 | End: 2018-04-26 | Stop reason: HOSPADM

## 2018-04-25 RX ORDER — SERTRALINE HYDROCHLORIDE 50 MG/1
50 TABLET, FILM COATED ORAL DAILY
Status: DISCONTINUED | OUTPATIENT
Start: 2018-04-25 | End: 2018-04-26 | Stop reason: HOSPADM

## 2018-04-25 RX ORDER — CEPHALEXIN 250 MG/1
500 CAPSULE ORAL 2 TIMES DAILY
Status: DISCONTINUED | OUTPATIENT
Start: 2018-04-25 | End: 2018-04-26 | Stop reason: HOSPADM

## 2018-04-25 RX ADMIN — CEPHALEXIN 500 MG: 250 CAPSULE ORAL at 21:57

## 2018-04-25 RX ADMIN — LITHIUM CARBONATE 300 MG: 300 TABLET ORAL at 21:57

## 2018-04-25 RX ADMIN — SODIUM CHLORIDE 1000 ML: 900 INJECTION, SOLUTION INTRAVENOUS at 19:35

## 2018-04-25 RX ADMIN — SERTRALINE HYDROCHLORIDE 50 MG: 50 TABLET ORAL at 21:57

## 2018-04-25 NOTE — ED TRIAGE NOTES
Patient comes in stating that she was on Latuda, Lithium, Zoloft and Benztropine. Patient never went to her follow up appt to get her meds refilled so she ran out and has been off of them for about a week now. Patient thinks that she may be going through withdrawals, and she is now having SI/HI. Patient states that she has envisioned herself drowning or starving himself and has had a vision of her choking her son with a belt.

## 2018-04-26 ENCOUNTER — HOSPITAL ENCOUNTER (INPATIENT)
Age: 28
LOS: 5 days | Discharge: HOME | DRG: 885 | End: 2018-05-01
Attending: PSYCHIATRY & NEUROLOGY | Admitting: PSYCHIATRY & NEUROLOGY
Payer: SELF-PAY

## 2018-04-26 VITALS
SYSTOLIC BLOOD PRESSURE: 120 MMHG | WEIGHT: 208 LBS | OXYGEN SATURATION: 98 % | BODY MASS INDEX: 35.51 KG/M2 | HEART RATE: 105 BPM | DIASTOLIC BLOOD PRESSURE: 72 MMHG | RESPIRATION RATE: 19 BRPM | HEIGHT: 64 IN | TEMPERATURE: 98.4 F

## 2018-04-26 PROBLEM — F31.9 BIPOLAR 1 DISORDER (HCC): Status: ACTIVE | Noted: 2018-04-26

## 2018-04-26 PROCEDURE — 96375 TX/PRO/DX INJ NEW DRUG ADDON: CPT

## 2018-04-26 PROCEDURE — 65220000001 HC RM PRIVATE PSYCH

## 2018-04-26 PROCEDURE — 96374 THER/PROPH/DIAG INJ IV PUSH: CPT

## 2018-04-26 PROCEDURE — 74011250637 HC RX REV CODE- 250/637: Performed by: EMERGENCY MEDICINE

## 2018-04-26 PROCEDURE — 74011250637 HC RX REV CODE- 250/637: Performed by: PSYCHIATRY & NEUROLOGY

## 2018-04-26 PROCEDURE — 74011250636 HC RX REV CODE- 250/636: Performed by: EMERGENCY MEDICINE

## 2018-04-26 RX ORDER — LORAZEPAM 2 MG/ML
1-2 INJECTION INTRAMUSCULAR
Status: DISCONTINUED | OUTPATIENT
Start: 2018-04-26 | End: 2018-05-01 | Stop reason: HOSPADM

## 2018-04-26 RX ORDER — SERTRALINE HYDROCHLORIDE 50 MG/1
50 TABLET, FILM COATED ORAL DAILY
Status: DISCONTINUED | OUTPATIENT
Start: 2018-04-27 | End: 2018-05-01 | Stop reason: HOSPADM

## 2018-04-26 RX ORDER — METOCLOPRAMIDE HYDROCHLORIDE 5 MG/ML
5 INJECTION INTRAMUSCULAR; INTRAVENOUS EVERY 6 HOURS
Status: DISCONTINUED | OUTPATIENT
Start: 2018-04-26 | End: 2018-04-26 | Stop reason: HOSPADM

## 2018-04-26 RX ORDER — IBUPROFEN 400 MG/1
400 TABLET ORAL
Status: DISCONTINUED | OUTPATIENT
Start: 2018-04-26 | End: 2018-05-01 | Stop reason: HOSPADM

## 2018-04-26 RX ORDER — CEPHALEXIN 250 MG/1
500 CAPSULE ORAL 2 TIMES DAILY
Status: DISCONTINUED | OUTPATIENT
Start: 2018-04-26 | End: 2018-05-01 | Stop reason: HOSPADM

## 2018-04-26 RX ORDER — LITHIUM CARBONATE 300 MG
300 TABLET ORAL 3 TIMES DAILY
Status: DISCONTINUED | OUTPATIENT
Start: 2018-04-26 | End: 2018-04-29

## 2018-04-26 RX ORDER — HALOPERIDOL 5 MG/1
5 TABLET ORAL
Status: DISCONTINUED | OUTPATIENT
Start: 2018-04-26 | End: 2018-05-01 | Stop reason: HOSPADM

## 2018-04-26 RX ORDER — HALOPERIDOL 5 MG/ML
5 INJECTION INTRAMUSCULAR
Status: DISCONTINUED | OUTPATIENT
Start: 2018-04-26 | End: 2018-05-01 | Stop reason: HOSPADM

## 2018-04-26 RX ORDER — DIPHENHYDRAMINE HYDROCHLORIDE 50 MG/ML
50 INJECTION, SOLUTION INTRAMUSCULAR; INTRAVENOUS ONCE
Status: COMPLETED | OUTPATIENT
Start: 2018-04-26 | End: 2018-04-26

## 2018-04-26 RX ORDER — CEFTRIAXONE 1 G/1
1 INJECTION, POWDER, FOR SOLUTION INTRAMUSCULAR; INTRAVENOUS
Status: COMPLETED | OUTPATIENT
Start: 2018-04-26 | End: 2018-04-26

## 2018-04-26 RX ORDER — ONDANSETRON 4 MG/1
4 TABLET, ORALLY DISINTEGRATING ORAL
Status: DISCONTINUED | OUTPATIENT
Start: 2018-04-26 | End: 2018-05-01 | Stop reason: HOSPADM

## 2018-04-26 RX ORDER — LORAZEPAM 1 MG/1
1-2 TABLET ORAL
Status: DISCONTINUED | OUTPATIENT
Start: 2018-04-26 | End: 2018-05-01 | Stop reason: HOSPADM

## 2018-04-26 RX ORDER — TRAZODONE HYDROCHLORIDE 50 MG/1
50 TABLET ORAL
Status: DISCONTINUED | OUTPATIENT
Start: 2018-04-26 | End: 2018-05-01 | Stop reason: HOSPADM

## 2018-04-26 RX ADMIN — DIPHENHYDRAMINE HYDROCHLORIDE 50 MG: 50 INJECTION, SOLUTION INTRAMUSCULAR; INTRAVENOUS at 08:12

## 2018-04-26 RX ADMIN — CEPHALEXIN 500 MG: 250 CAPSULE ORAL at 21:13

## 2018-04-26 RX ADMIN — LITHIUM CARBONATE 300 MG: 300 TABLET ORAL at 09:01

## 2018-04-26 RX ADMIN — LITHIUM CARBONATE 300 MG: 300 TABLET ORAL at 21:13

## 2018-04-26 RX ADMIN — LITHIUM CARBONATE 300 MG: 300 TABLET ORAL at 16:19

## 2018-04-26 RX ADMIN — CEPHALEXIN 500 MG: 250 CAPSULE ORAL at 08:12

## 2018-04-26 RX ADMIN — CEFTRIAXONE 1 G: 1 INJECTION, POWDER, FOR SOLUTION INTRAMUSCULAR; INTRAVENOUS at 08:12

## 2018-04-26 RX ADMIN — METOCLOPRAMIDE 5 MG: 5 INJECTION, SOLUTION INTRAMUSCULAR; INTRAVENOUS at 08:11

## 2018-04-26 NOTE — IP AVS SNAPSHOT
[address] [address] [facility] James Nielsen Patient: Mariama Aldana MRN: [id number] VZB:9/4/[id number] About your hospitalization You were admitted on:  April 26, 2018 You last received care in the:  [facility] 1 ADULT CHEM DEP You were discharged on:  May 1, 2018 Why you were hospitalized Your primary diagnosis was:  Bipolar 1 Disorder, Depressed, Severe (Hcc) Follow-up Information Follow up With Details Comments Contact Info MD Priscilla Kerns [address] [facility] [address] [facility] [phone] 
[phone] [facility]  Patient is scheduled with Bailee Serna on 5/30/18 @ 1:45pm [address] [address] ([facility]) [address] 
[phone] [employer]  Intake hours Monday-Thursday 8am-12noon [address] 
[phone] Discharge Orders None A check anitha indicates which time of day the medication should be taken. My Medications START taking these medications Instructions Each Dose to Equal  
 Morning Noon Evening Bedtime  
 benztropine 0.5 mg tablet Commonly known as:  COGENTIN Your last dose was: Your next dose is: Take 1 Tab by mouth two (2) times a day. Indications: drug-induced extrapyramidal reaction 0.5 mg  
    
   
   
   
  
 cephALEXin 500 mg capsule Commonly known as:  Isidro Prima Your last dose was: Your next dose is: Take 1 Cap by mouth two (2) times a day. Indications: UTI  
 500 mg  
    
   
   
   
  
 lithium carbonate 300 mg tablet Your last dose was: Your next dose is: Take 1 Tab by mouth two (2) times a day. Indications: Bipolar Disorder 300 mg  
    
   
   
   
  
 lurasidone 40 mg Tab tablet Commonly known as:  Regina Golder Your last dose was: Your next dose is: Take 1 Tab by mouth daily (with dinner). Indications: DEPRESSION ASSOCIATED WITH BIPOLAR DISORDER  
 40 mg  
    
   
   
   
  
 sertraline 50 mg tablet Commonly known as:  ZOLOFT Your last dose was: Your next dose is: Take 1 Tab by mouth daily. Indications: DEPRESSION ASSOCIATED WITH BIPOLAR DISORDER  
 50 mg CONTINUE taking these medications Instructions Each Dose to Equal  
 Morning Noon Evening Bedtime DEPO-PROVERA IM Your last dose was: Your next dose is:    
   
   
 Inject  into the muscle. Where to Get Your Medications Information on where to get these meds will be given to you by the nurse or doctor. ! Ask your nurse or doctor about these medications  
  benztropine 0.5 mg tablet  
 cephALEXin 500 mg capsule  
 lithium carbonate 300 mg tablet  
 lurasidone 40 mg Tab tablet  
 sertraline 50 mg tablet Discharge Instructions BEHAVIORAL HEALTH NURSING DISCHARGE NOTE Emergency Numbers [facility] Desk: [phone] [facility]: [phone] Suicide Prevention Line: [phone] (TALK) The following personal items collected during your admission are returned to you:  
Dental Appliance: Dental Appliances: None Vision:   
Hearing Aid:   
Jewelry: Jewelry: None Clothing: Clothing: Slippers, Jacket/Coat, Pants, Shirt Other Valuables: Other Valuables: Cell Phone, Purse (locker 113/1) Valuables sent to safe: Personal Items Sent to Safe: $570.00cash [employer], bb&t net [facility], [facility] The discharge information has been reviewed with the patient. The patient verbalized understanding. Formotus Activation Thank you for requesting access to Formotus. Please follow the instructions below to securely access and download your online medical record.  Form[employer]us allows you to send messages to your doctor, view your test results, renew your prescriptions, schedule appointments, and more. How Do I Sign Up? In your internet browser, go to [email or web address] Click on the First Time User? Click Here link in the Sign In box. You will be redirect to the New Member Sign Up page. Enter your Infer Access Code exactly as it appears below. You will not need to use this code after youve completed the sign-up process. If you do not sign up before the expiration date, you must request a new code. Infer Access Code: [email or web address] Expires: 2018  6:51 PM (This is the date your Infer access code will ) Enter the last four digits of your Social Security Number (xxxx) and Date of Birth (mm/dd/yyyy) as indicated and click Submit. You will be taken to the next sign-up page. Create a Infer ID. This will be your Infer login ID and cannot be changed, so think of one that is secure and easy to remember. Create a Infer password. You can change your password at any time. Enter your Password Reset Question and Answer. This can be used at a later time if you forget your password. Enter your e-mail address. You will receive e-mail notification when new information is available in [address] Click Sign Up. You can now view and download portions of your medical record. Click the Ximalaya link to download a portable copy of your medical information. Additional Information If you have questions, please visit the Frequently Asked Questions section of the Infer website at [email or web address] Remember, Infer is NOT to be used for urgent needs. For medical emergencies, dial 911. Patient armband removed and shredded Infer Announcement We are excited to announce that we are making your provider's discharge notes available to you in Infer.   You will see these notes when they are completed and signed by the physician that discharged you from your recent hospital stay. If you have any questions or concerns about any information you see in [employer], please call the Health Information Department where you were seen or reach out to your Primary Care Provider for more information about your plan of care. Introducing [facility]! Katie Schroeder introduces [employer] patient portal. Now you can access parts of your medical record, email your doctor's office, and request medication refills online. 1. In your internet browser, go to [email or web address] 2. Click on the First Time User? Click Here link in the Sign In box. You will see the New Member Sign Up page. 3. Enter your [employer] Access Code exactly as it appears below. You will not need to use this code after youve completed the sign-up process. If you do not sign up before the expiration date, you must request a new code. · [employer] Access Code: [email or web address] Expires: 7/24/2018  6:51 PM 
 
4. Enter the last four digits of your Social Security Number (xxxx) and Date of Birth (mm/dd/yyyy) as indicated and click Submit. You will be taken to the next sign-up page. 5. Create a [employer] ID. This will be your [employer] login ID and cannot be changed, so think of one that is secure and easy to remember. 6. Create a [employer] password. You can change your password at any time. 7. Enter your Password Reset Question and Answer. This can be used at a later time if you forget your password. 8. Enter your e-mail address. You will receive e-mail notification when new information is available in [address] 9. Click Sign Up. You can now view and download portions of your medical record. 10. Click the Download Summary menu link to download a portable copy of your medical information. If you have questions, please visit the Frequently Asked Questions section of the [employer] website. Remember, [employer] is NOT to be used for urgent needs. For medical emergencies, dial 911. Now available from your iPhone and Android! Introducing Mauri Caban As a [facility] patient, I wanted to make you aware of our electronic visit tool called Mauri Caban. Enhanced Surface Dynamics 24/7 allows you to connect within minutes with a medical provider 24 hours a day, seven days a week via a mobile device or tablet or logging into a secure website from your computer. You can access Mauri Caban from anywhere in the [place]. A virtual visit might be right for you when you have a simple condition and feel like you just dont want to get out of bed, or cant get away from work for an appointment, when your regular [facility] provider is not available (evenings, weekends or holidays), or when youre out of town and need minor care. Electronic visits cost only $49 and if the [facility] [employer] provider determines a prescription is needed to treat your condition, one can be electronically transmitted to a nearby pharmacy*. Please take a moment to enroll today if you have not already done so. The enrollment process is free and takes just a few minutes. To enroll, please download the DataVote/Omniox stiven to your tablet or phone, or visit [email or web address] to enroll on your computer. And, as an [address] [place] [address] patient with a Any.DO account, the results of your visits will be scanned into your electronic medical record and your primary care provider will be able to view the scanned results. We urge you to continue to see your regular [facility] provider for your ongoing medical care. And while your primary care provider may not be the one available when you seek a Mauri Caban virtual visit, the peace of mind you get from getting a real diagnosis real time can be priceless. For more information on Mauri Caban, view our Frequently Asked Questions (FAQs) at [email or web address]. Sincerely, 
 
Matthew Harley MD 
Chief Medical Officer [address] [place] [address] *:  certain medications cannot be prescribed via Mauri Caban Providers Seen During Your Hospitalization Provider Specialty Primary office phone Zara May MD Psychiatry [phone] Your Primary Care Physician (PCP) Primary Care Physician Office Phone Office Fax [address] [facility], [address] [place] [address] Road [phone] You are allergic to the following Allergen Reactions Amoxicillin Itching Oxycodone-Acetaminophen Itching Recent Documentation Height Weight BMI OB Status Smoking Status 1.626 m 94.3 kg 35.7 kg/m2 Injection Never Smoker Emergency Contacts Name Discharge Info Relation Home Work Mobile Sarai Finch DISCHARGE CAREGIVER [3] Mother [14]   [phone] [address] [facility] CAREGIVER [3] Parent [1] [phone] Patient Belongings The following personal items are in your possession at time of discharge: 
  Dental Appliances: None         Home Medications: None   Jewelry: None  Clothing: Slippers, Jacket/Coat, Pants, Shirt    Other Valuables: Cell Phone, Purse (locker 113/1)  Personal Items Sent to Safe: Bear Jarrod, bb&t pauline marin, va ebt Please provide this summary of care documentation to your next provider. Signatures-by signing, you are acknowledging that this After Visit Summary has been reviewed with you and you have received a copy. Patient Signature:  ____________________________________________________________ Date:  ____________________________________________________________  
  
Tuyet Paul Provider Signature:  ____________________________________________________________ Date:  ____________________________________________________________

## 2018-04-26 NOTE — ED PROVIDER NOTES
HPI Comments: Mariama Aldana is a 29 y.o. Female with c/o increased depression si for last 3 weeks. Was on mult meds for her bipolar but has not taken in awhile. Has felt hot, sweaty as well. No nvd, abd pain, cough, cp. Some urinary freq. No fever. Sx are constant with severe depression, increased sleep, dec appetite. Worse with being alone. Has never been admitted for mental health issues in past. Lives at home with kids who are with mother. Has thoughts of drowning herself now. No prior attempts in the past    The history is provided by the patient. Past Medical History:   Diagnosis Date    Anxiety     Bipolar affective disorder [facility])     being followed by Dr. Asia Rich Trichomonas vaginalis infection 8/26/2015    Vitamin B12 deficiency 5/2015    Vitamin D deficiency 5/2015       History reviewed. No pertinent surgical history. Family History:   Problem Relation Age of Onset    Hypertension Mother     Migraines Mother     Bipolar Disorder Sister        Social History     Social History    Marital status:      Spouse name: N/A    Number of children: N/A    Years of education: N/A     Occupational History    Not on file. Social History Main Topics    Smoking status: Never Smoker    Smokeless tobacco: Never Used      Comment: Pt counseled to continue to not smoke.  Alcohol use 0.0 oz/week     0 Standard drinks or equivalent per week      Comment: socially    Drug use: No    Sexual activity: Yes     Partners: Male     Birth control/ protection: Pill, Condom     Other Topics Concern    Not on file     Social History Narrative         ALLERGIES: Amoxicillin and Oxycodone-acetaminophen    Review of Systems   Constitutional: Positive for appetite change and fatigue. HENT: Negative for sore throat and trouble swallowing. Eyes: Negative for visual disturbance. Respiratory: Negative for cough and shortness of breath. Cardiovascular: Negative for chest pain. Gastrointestinal: Negative for abdominal pain. Endocrine: Negative for polyuria. Genitourinary: Negative for difficulty urinating. Musculoskeletal: Negative for gait problem. Skin: Negative for rash. Allergic/Immunologic: Negative for immunocompromised state. Neurological: Positive for light-headedness. Psychiatric/Behavioral: Positive for sleep disturbance and suicidal ideas. The patient is nervous/anxious. Vitals:    04/25/18 1851 04/25/18 2206   BP: (!) 156/95 137/90   Pulse: (!) 115 (!) 102   Resp: 18 16   Temp: 98.6 °F (37 °C) 97 °F (36.1 °C)   SpO2: 100% 99%   Weight: 94.3 kg (208 lb)    Height: 5' 4\" (1.626 m)             Physical Exam   Constitutional: She is oriented to person, place, and time. She appears well-developed and well-nourished. She appears distressed (very anxious). HENT:   Head: Normocephalic and atraumatic. Right Ear: External ear normal.   Left Ear: External ear normal.   Nose: Nose normal.   Mouth/Throat: Uvula is midline, oropharynx is clear and moist and mucous membranes are normal.   Eyes: Conjunctivae are normal. No scleral icterus. Neck: Neck supple. Cardiovascular: Regular rhythm, normal heart sounds and intact distal pulses. Tachycardia present. Pulmonary/Chest: Effort normal and breath sounds normal.   Abdominal: Soft. There is no tenderness. Musculoskeletal: She exhibits no edema. Neurological: She is alert and oriented to person, place, and time. Gait normal.   Skin: Skin is warm and dry. She is not diaphoretic. Psychiatric: Her speech is normal and behavior is normal. Her mood appears anxious. Thought content is not paranoid and not delusional. She exhibits a depressed mood. She expresses suicidal ideation. She expresses no homicidal ideation. She expresses suicidal plans. She expresses no homicidal plans. Nursing note and vitals reviewed.        [facility]      ED Course       Procedures      Vitals:  Patient Vitals for the past 12 hrs:   Temp Pulse Resp BP SpO2   04/25/18 2206 97 °F (36.1 °C) (!) 102 16 137/90 99 %   04/25/18 1851 98.6 °F (37 °C) (!) 115 18 (!) 156/95 100 %         Medications ordered:   Medications   sertraline (ZOLOFT) tablet 50 mg (50 mg Oral Given 4/25/18 2157)   lithium carbonate tablet 300 mg (300 mg Oral Given 4/25/18 2157)   cephALEXin (KEFLEX) capsule 500 mg (500 mg Oral Given 4/25/18 2157)   sodium chloride 0.9 % bolus infusion 1,000 mL (0 mL IntraVENous IV Completed 4/25/18 2129)         Lab findings:  Recent Results (from the past 12 hour(s))   URINALYSIS W/ RFLX MICROSCOPIC    Collection Time: 04/25/18  7:00 PM   Result Value Ref Range    Color DARK YELLOW      Appearance CLOUDY      Specific gravity 1.018 1.005 - 1.030      pH (UA) 5.5 5.0 - 8.0      Protein NEGATIVE  NEG mg/dL    Glucose NEGATIVE  NEG mg/dL    Ketone TRACE (A) NEG mg/dL    Bilirubin NEGATIVE  NEG      Blood NEGATIVE  NEG      Urobilinogen 2.0 (H) 0.2 - 1.0 EU/dL    Nitrites POSITIVE (A) NEG      Leukocyte Esterase SMALL (A) NEG     HCG URINE, QL    Collection Time: 04/25/18  7:00 PM   Result Value Ref Range    HCG urine, QL NEGATIVE  NEG     DRUG SCREEN, URINE    Collection Time: 04/25/18  7:00 PM   Result Value Ref Range    BENZODIAZEPINES NEGATIVE  NEG      BARBITURATES NEGATIVE  NEG      THC (TH-CANNABINOL) POSITIVE (A) NEG      OPIATES NEGATIVE  NEG      PCP(PHENCYCLIDINE) NEGATIVE  NEG      COCAINE NEGATIVE  NEG      AMPHETAMINES NEGATIVE  NEG      METHADONE NEGATIVE  NEG      HDSCOM (NOTE)    URINE MICROSCOPIC ONLY    Collection Time: 04/25/18  7:00 PM   Result Value Ref Range    WBC 4 to 10 0 - 4 /hpf    RBC NEGATIVE  0 - 5 /hpf    Epithelial cells FEW 0 - 5 /lpf    Bacteria 4+ (A) NEG /hpf   CBC WITH AUTOMATED DIFF    Collection Time: 04/25/18  7:35 PM   Result Value Ref Range    WBC 9.7 4.6 - 13.2 K/uL    RBC 5.79 (H) 4.20 - 5.30 M/uL    HGB 15.0 12.0 - 16.0 g/dL    HCT 43.7 35.0 - 45.0 %    MCV 75.5 74.0 - 97.0 FL    MCH 25.9 24.0 - 34.0 PG MCHC 34.3 31.0 - 37.0 g/dL    RDW 17.0 (H) 11.6 - 14.5 %    PLATELET 063 866 - 882 K/uL    NEUTROPHILS 54 42 - 75 %    LYMPHOCYTES 36 20 - 51 %    MONOCYTES 10 (H) 2 - 9 %    EOSINOPHILS 0 0 - 5 %    BASOPHILS 0 0 - 3 %    ABS. NEUTROPHILS 5.2 1.8 - 8.0 K/UL    ABS. LYMPHOCYTES 3.5 0.8 - 3.5 K/UL    ABS. MONOCYTES 1.0 0 - 1.0 K/UL    ABS. EOSINOPHILS 0.0 0.0 - 0.4 K/UL    ABS. BASOPHILS 0.0 0.0 - 0.1 K/UL    RBC COMMENTS MICROCYTOSIS  1+        WBC COMMENTS REACTIVE LYMPHS      DF MANUAL     METABOLIC PANEL, COMPREHENSIVE    Collection Time: 04/25/18  7:35 PM   Result Value Ref Range    Sodium 139 136 - 145 mmol/L    Potassium 3.9 3.5 - 5.5 mmol/L    Chloride 104 100 - 108 mmol/L    CO2 27 21 - 32 mmol/L    Anion gap 8 3.0 - 18 mmol/L    Glucose 81 74 - 99 mg/dL    BUN 5 (L) 7.0 - 18 MG/DL    Creatinine 0.83 0.6 - 1.3 MG/DL    BUN/Creatinine ratio 6 (L) 12 - 20      GFR est AA >60 >60 ml/min/1.73m2    GFR est non-AA >60 >60 ml/min/1.73m2    Calcium 8.6 8.5 - 10.1 MG/DL    Bilirubin, total 0.6 0.2 - 1.0 MG/DL    ALT (SGPT) 25 13 - 56 U/L    AST (SGOT) 27 15 - 37 U/L    Alk. phosphatase 121 (H) 45 - 117 U/L    Protein, total 7.7 6.4 - 8.2 g/dL    Albumin 3.5 3.4 - 5.0 g/dL    Globulin 4.2 (H) 2.0 - 4.0 g/dL    A-G Ratio 0.8 0.8 - 1.7     ETHYL ALCOHOL    Collection Time: 04/25/18  7:35 PM   Result Value Ref Range    ALCOHOL(ETHYL),SERUM <3 0 - 3 MG/DL   TSH 3RD GENERATION    Collection Time: 04/25/18  7:35 PM   Result Value Ref Range    TSH 0.53 0.36 - 3.74 uIU/mL   LITHIUM    Collection Time: 04/25/18  7:35 PM   Result Value Ref Range    Lithium level <0.20 (L) 0.6 - 1.2 MMOL/L       EKG interpretation by ED Physician:      X-Ray, CT or other radiology findings or impressions:  No orders to display       Progress notes, Consult notes or additional Procedure notes:   Seen by telepsych consult reviewed. Pt voluntary. meds ordered per rec along with keflex  D/w pt results, pending placement.  Given lack of insurance will need case management assistance  Will turn over to dr Sunil Fleming in am at approx 6am to f/u on placement    Reevaluation of patient:   Stable with no acute medical condition that would prevent transfer to mental health facility    Disposition:  Diagnosis:   1. Suicidal ideations    2. Acute UTI        Disposition: pending placement    Follow-up Information     None            Patient's Medications   Start Taking    No medications on file   Continue Taking    MEDROXYPROGESTERONE ACETATE (DEPO-PROVERA IM)    Inject  into the muscle. These Medications have changed    No medications on file   Stop Taking    BUTALBITAL-ACETAMINOPHEN-CAFFEINE (FIORICET, ESGIC) -40 MG PER TABLET    Take 1 Tab by mouth every eight (8) hours as needed for Headache. Max Daily Amount: 3 Tabs. CYCLOBENZAPRINE (FLEXERIL) 5 MG TABLET    TAKE 1 TABLET BY MOUTH AT BEDTIME AS NEEDED FOR MUSCLE SPASM    FLUTICASONE (FLONASE) 50 MCG/ACTUATION NASAL SPRAY    1 Dudley by Both Nostrils route daily. IBUPROFEN (MOTRIN) 800 MG TABLET    Take 1 Tab by mouth two (2) times daily as needed for Pain. LORATADINE (CLARITIN) 10 MG TABLET    Take 1 Tab by mouth daily. SERTRALINE (ZOLOFT) 25 MG TABLET    Take 1 Tab by mouth daily. TRAZODONE/DIETARY SUPP. NO.8 (TRAZAMINE PO)    Take  by mouth nightly.

## 2018-04-26 NOTE — ED NOTES
[facility] Quick called for report and per Jaleesa Han they have not fully accepted patient yet and will call back

## 2018-04-26 NOTE — PROGRESS NOTES
6:13 AM : Pt care transferred to Dr. Chidi Marcial, ED provider. History of patient complaint(s), available diagnostic reports and current treatment plan has been discussed thoroughly. Bedside rounding on patient occured : yes . Intended disposition of patient : Placement by Case management  Pending diagnostics reports and/or labs (please list): N/A    ED Course   Comment By Time   Bipolar, off meds, thoughts of drowning herself, no insurance, start on lithium and Zoloft, UTI on keflex. Consult in for CM, CSB? Nirav Mcguire DO 04/26 2993   Given rocephin for UTI since pt vomiting.  Nirav Mcguire DO 04/26 8489

## 2018-04-26 NOTE — IP AVS SNAPSHOT
[address] [facility] Patient: Lazarus Earls MRN: [id number] EQY:[birth year] A check anitha indicates which time of day the medication should be taken. My Medications START taking these medications Instructions Each Dose to Equal  
 Morning Noon Evening Bedtime  
 benztropine 0.5 mg tablet Commonly known as:  COGENTIN Your last dose was: Your next dose is: Take 1 Tab by mouth two (2) times a day. Indications: drug-induced extrapyramidal reaction 0.5 mg  
    
   
   
   
  
 cephALEXin 500 mg capsule Commonly known as:  Babita Carter Your last dose was: Your next dose is: Take 1 Cap by mouth two (2) times a day. Indications: UTI  
 500 mg  
    
   
   
   
  
 lithium carbonate 300 mg tablet Your last dose was: Your next dose is: Take 1 Tab by mouth two (2) times a day. Indications: Bipolar Disorder 300 mg  
    
   
   
   
  
 lurasidone 40 mg Tab tablet Commonly known as:  Dena Lame Your last dose was: Your next dose is: Take 1 Tab by mouth daily (with dinner). Indications: DEPRESSION ASSOCIATED WITH BIPOLAR DISORDER  
 40 mg  
    
   
   
   
  
 sertraline 50 mg tablet Commonly known as:  ZOLOFT Your last dose was: Your next dose is: Take 1 Tab by mouth daily. Indications: DEPRESSION ASSOCIATED WITH BIPOLAR DISORDER  
 50 mg CONTINUE taking these medications Instructions Each Dose to Equal  
 Morning Noon Evening Bedtime DEPO-PROVERA IM Your last dose was: Your next dose is:    
   
   
 Inject  into the muscle. Where to Get Your Medications Information on where to get these meds will be given to you by the nurse or doctor. ! Ask your nurse or doctor about these medications benztropine 0.5 mg tablet  
 cephALEXin 500 mg capsule  
 lithium carbonate 300 mg tablet  
 lurasidone 40 mg Tab tablet  
 sertraline 50 mg tablet

## 2018-04-26 NOTE — ED NOTES
Pt presents with SI/HI with \"visions\" of herself drowning herself or starving herself to death  Per pt she also has \"visions\" of killing her son by choking him with a belt  Off psychiatric medications > 1 week  Voluntary

## 2018-04-26 NOTE — BH NOTES
Boywilfrid Moran is not participating in [address] Group    Group time: 1 hour    Personal goal for participation:  Seek information on Alcohol  abuse    Goal orientation:  Passive    Group therapy participation:  refuse    Therapeutic interventions reviewed and discussed: Staff encouraged Pt. To participate in Group    Impression of participation:  Pt.  Refuse chose to rest in bed despite staff encouragement

## 2018-04-26 NOTE — BH NOTES
Patient arrived on the unit from [facility] ED with complaints of depression and SI. Patient states she was taking Latuda 20mg daily, Lithium 450mg BID, Zoloft 10mg daily and Cogentin 0.5mg BID but stopped taking them a few days ago because patient no longer has insurance and cannot afford the medications. Patient denies active SI in the hospital and does contract for safety. Patient states she has had AVH in the past but denies hallucinations currently. Patient contracts for safety and sees a psychiatrist at [address] [facility].

## 2018-04-26 NOTE — ED NOTES
Pt actively vomiting and per sitter the patient has a temp of 104.4. Temp rechecked at 100.5 oral was noted. Pt reports headache.  This was discussed with Dr. Rachel Dueñas

## 2018-04-26 NOTE — CONSULTS
Name: Duke Moreland                                                                          : 89  Date: 18                                                                                Time: 930p et                 Location of patient: [facility] ED                                              Location of doctor: [facility]    This evaluation was conducted via telepsychiatry with the assistance of onsite staff     Chief Complaint: suicidal     History of Present Illness:   Pt is a 28 yo AAF with hx of Bipolar presents with SI and HI, off meds for 1-2 weeks, thoughts of wanting to drown or starve, and thoughts of wanting to choke her son with belt. Pt reports mood has been up and down, increased sleep, decreased appetite and not eating much. Says she usually does good on meds, just couldn't go to doc appt this month because her insurance changed, they said income was too high, pt works 2 jobs. Pt says kids are safe with her mom.  Pt seeking help, says Zoloft helps her best, has been on for long time, and Latuda and Lithium has helped too for past 3 months, no side effects.         SI/ Self harm: denies previous attempts  HI/Violence: denies  Access to weapons: denies  Legal: denies  Psychiatric History/Treatment History: no psych admits, goes to [address] with Dr Payal Zambrano for outpatient    Drug/Alcohol History: admits to social drinking on weekends and THC use sometimes    Medical History: per chart  Medications & Freq: on Latuda 20mg, Lithium 450mg bid, Zoloft 100mg, Cogentin 0.5mg bid    Allergies: per chart  Family Psych History/History of suicide: unknown  Social History: pt lives at home with 2 kids (10, 6, boys),                     Employment: works in [employer] and [employer]                    Education: some college                    Stressors: access to health care  Mental Status Exam:   Appearance and attire: hosp gown, sitting up in bed  Attitude and behavior: seeking help  Speech: rrr  Mood/Affect: okay/constricted  Thought processes: linear  Thought content: +si, +hi  Perception: no avh  Cognition: grossly intact  Insight and judgment: limited     Diagnosis:   Bipolar d/o, hx of anxiety and OCD     Treatment Recommendations:    -recommend inpatient psych admission, voluntary, pt off meds for 1-2 weeks, +SI and +HI  -recommend restarting meds:  Lithium 300mg po bid for mood, taper up as needed  Zoloft 50mg po daily for depression, taper up as needed  -pt will need outpatient psych f/u

## 2018-04-26 NOTE — BH NOTES
Twyla Marie is not participating in Recreational Therapy. Group time: 1745    Personal goal for participation: fresh air break    Goal orientation: social    Group therapy participation: refuse    Therapeutic interventions reviewed and discussed: Staff encouraged Pt.  To participate in group    Impression of participation: Pt refuse, chose to rest in bed despite staff encouragement

## 2018-04-27 PROBLEM — F31.4 BIPOLAR 1 DISORDER, DEPRESSED, SEVERE (HCC): Chronic | Status: ACTIVE | Noted: 2018-04-26

## 2018-04-27 PROCEDURE — 65220000001 HC RM PRIVATE PSYCH

## 2018-04-27 PROCEDURE — 74011250637 HC RX REV CODE- 250/637: Performed by: PSYCHIATRY & NEUROLOGY

## 2018-04-27 RX ORDER — BENZTROPINE MESYLATE 1 MG/1
0.5 TABLET ORAL 2 TIMES DAILY
Status: DISCONTINUED | OUTPATIENT
Start: 2018-04-27 | End: 2018-05-01 | Stop reason: HOSPADM

## 2018-04-27 RX ADMIN — LORAZEPAM 1 MG: 1 TABLET ORAL at 12:55

## 2018-04-27 RX ADMIN — BENZTROPINE MESYLATE 0.5 MG: 1 TABLET ORAL at 12:21

## 2018-04-27 RX ADMIN — BENZTROPINE MESYLATE 0.5 MG: 1 TABLET ORAL at 20:40

## 2018-04-27 RX ADMIN — CEPHALEXIN 500 MG: 250 CAPSULE ORAL at 20:39

## 2018-04-27 RX ADMIN — SERTRALINE HYDROCHLORIDE 50 MG: 50 TABLET ORAL at 08:29

## 2018-04-27 RX ADMIN — CEPHALEXIN 500 MG: 250 CAPSULE ORAL at 08:29

## 2018-04-27 RX ADMIN — LITHIUM CARBONATE 300 MG: 300 TABLET ORAL at 08:29

## 2018-04-27 RX ADMIN — LITHIUM CARBONATE 300 MG: 300 TABLET ORAL at 20:39

## 2018-04-27 RX ADMIN — LURASIDONE HYDROCHLORIDE 20 MG: 20 TABLET, FILM COATED ORAL at 12:21

## 2018-04-27 RX ADMIN — LITHIUM CARBONATE 300 MG: 300 TABLET ORAL at 14:37

## 2018-04-27 NOTE — BH NOTES
Pt quiet, in room much of shift; mood sad, affect congruent with mood; cooperative, minimal compliance, minimal to no interaction, no behavioral issues during shift. Endorses hearing voices and seeing shadows, denies intent to harm self/others, involved in no falls this shift - skid resistant footwear utilized and floor kept free of items that could precipitate a fall.

## 2018-04-27 NOTE — BH NOTES
Patient came to desk after assessment from NP asking about BP medicine. Patient's BP was elevated this morning but is not consistently elevated. Patient states she just wants to fell better. Patient complaints of nausea and restlessness. Patient offered Ativan 1mg for anxiety to assist with symptoms and educated on anxiety and its presentation.  Patient appreciative and went back to room to rest.

## 2018-04-27 NOTE — H&P
History and Physical        Patient: Toni Hawthorne               Sex: female          DOA: 4/26/2018         YOB: 1990      Age:  29 y.o.        LOS:  LOS: 1 day        HPI:     Toni Hawthorne is a 29 y.o. female who was admitted experiencing thoughts to hurt herself and others. Principal Problem:    Bipolar 1 disorder, depressed, severe ([facility] (4/26/2018)        Past Medical History:   Diagnosis Date    Anxiety     Bipolar 1 disorder, depressed, severe ([facility] 4/26/2018    Bipolar affective disorder ([facility]     being followed by Dr. Wendi Stein Trichomonas vaginalis infection 8/26/2015    Vitamin B12 deficiency 5/2015    Vitamin D deficiency 5/2015       No past surgical history on file. Family History   Problem Relation Age of Onset    Hypertension Mother     Migraines Mother     Bipolar Disorder Sister        Social History     Social History    Marital status:      Spouse name: N/A    Number of children: 2    Years of education: Some college     Social History Main Topics    Smoking status: Never Smoker    Smokeless tobacco: Never Used      Comment: Pt counseled to continue to not smoke.  Alcohol use 0.0 oz/week     0 Standard drinks or equivalent per week      Comment: socially    Drug use: No    Sexual activity: Yes     Partners: Male     Birth control/ protection: Pill, Condom     Other Topics Concern    Not on file     Social History Narrative   Lives with children. States appetite has been okay, sleep fair. Employed. Prior to Admission medications    Medication Sig Start Date End Date Taking? Authorizing Provider   MEDROXYPROGESTERONE ACETATE (DEPO-PROVERA IM) Inject  into the muscle. Historical Provider       Allergies   Allergen Reactions    Amoxicillin Itching    Oxycodone-Acetaminophen Itching       Review of Systems  A comprehensive review of systems was negative.       Physical Exam:      Visit Vitals    /89    Pulse 96  Temp 98.7 °F (37.1 °C)    Resp 18    Ht 5' 4\" (1.626 m)    Wt 208 lb (94.3 kg)    BMI 35.7 kg/m2       Physical Exam:    General:  Alert, cooperative, well developed, well nourished, obese AA female, no distress, appears stated age. Eyes:  Conjunctivae/corneas clear. PERRL, EOMs intact. Fundi benign   Ears:  Normal TMs and external ear canals both ears. Nose: Nares normal. Septum midline. Mucosa normal. No drainage or sinus tenderness. Mouth/Throat: Lips, mucosa, and tongue normal. Teeth and gums normal.   Neck: Supple, symmetrical, trachea midline, no adenopathy, thyroid: no enlargement/tenderness/nodules, no carotid bruit and no JVD. Back:   Symmetric, no curvature. ROM normal. No CVA tenderness. Lungs:   Clear to auscultation bilaterally. Heart:  Regular rate and rhythm, S1, S2 normal, no murmur, click, rub or gallop. Abdomen:   Soft, non-tender. Bowel sounds normal. No masses,  No organomegaly. Extremities: Extremities normal, atraumatic, no cyanosis or edema. Pulses: 2+ and symmetric all extremities. Skin: Skin color, texture, turgor normal. No rashes or lesions   Lymph nodes: Cervical, supraclavicular, and axillary nodes normal.   Neurologic: CNII-XII intact. Normal strength, sensation and reflexes throughout.            Assessment/Plan     Depression  Suicidal ideation  Homicidal ideation  Labs reviewed (+THC)  Treatment per physician's orders

## 2018-04-27 NOTE — BSMART NOTE
SOCIAL WORK GROUP THERAPY PROGRESS NOTE    Group Time:  11am    Group Topic:  Coping Skills    Group Participation:   Pt reportedly did not attend group due to medical issues

## 2018-04-27 NOTE — BSMART NOTE
ACTIVITIES THERAPY PROGRESS NOTE    Group time:1530  . The patient did not get up when called for group.

## 2018-04-27 NOTE — BSMART NOTE
SW assessment/Intervention:  Chart reviewed and discussed it reports:  Ashley Sandy was interviewed by this writer today. Improving. She continues to feels medications are helpful. Pt is medication compliant and denies medication side effects including TD, EPS, akathisia and mood derangements. Pt shared she is eating and sleeping well. Pt denies SI, HI and AVH. SW made contact with patient who expressed no major concerns. Patients mood and affect is flat. SW encouraged patient to attend group activities as well as engage in positive peer interaction. SW will continue to monitor patient during hospitalization. SW will consult with treating psychiatrist to complete disposition.     MILAGRO Palacios, Supervisee in Social Work    [phone]
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
## 2018-04-27 NOTE — BH NOTES
GROUP THERAPY PROGRESS NOTE    Sarah Spencer is participating in [employer]. Group time: 30 minutes    Personal goal for participation: UNIT GUIDELINES AND PARTICIPATION IN TREATMENT    Goal orientation: personal    Group therapy participation: passive    Therapeutic interventions reviewed and discussed: Made aware of importance of treatment   and importance of follow up regarding medication and wit therapist.    Impression of participation: Pt sat in  group although voiced no major concerns or complaints.

## 2018-04-28 LAB
BACTERIA SPEC CULT: ABNORMAL
SERVICE CMNT-IMP: ABNORMAL

## 2018-04-28 PROCEDURE — 74011250637 HC RX REV CODE- 250/637: Performed by: PSYCHIATRY & NEUROLOGY

## 2018-04-28 PROCEDURE — 65220000001 HC RM PRIVATE PSYCH

## 2018-04-28 RX ADMIN — SERTRALINE HYDROCHLORIDE 50 MG: 50 TABLET ORAL at 08:27

## 2018-04-28 RX ADMIN — LITHIUM CARBONATE 300 MG: 300 TABLET ORAL at 08:27

## 2018-04-28 RX ADMIN — LURASIDONE HYDROCHLORIDE 20 MG: 20 TABLET, FILM COATED ORAL at 08:27

## 2018-04-28 RX ADMIN — CEPHALEXIN 500 MG: 250 CAPSULE ORAL at 21:09

## 2018-04-28 RX ADMIN — LORAZEPAM 1 MG: 1 TABLET ORAL at 18:03

## 2018-04-28 RX ADMIN — CEPHALEXIN 500 MG: 250 CAPSULE ORAL at 08:27

## 2018-04-28 RX ADMIN — BENZTROPINE MESYLATE 0.5 MG: 1 TABLET ORAL at 21:09

## 2018-04-28 RX ADMIN — LITHIUM CARBONATE 300 MG: 300 TABLET ORAL at 14:06

## 2018-04-28 RX ADMIN — LITHIUM CARBONATE 300 MG: 300 TABLET ORAL at 21:09

## 2018-04-28 RX ADMIN — BENZTROPINE MESYLATE 0.5 MG: 1 TABLET ORAL at 08:00

## 2018-04-28 NOTE — PROGRESS NOTES
Problem: Depressed Mood (Adult/Pediatric)  Goal: *STG: Attends activities and groups  Pt will attend at least 3 groups daily while hospitalized. Outcome: Not Progressing Towards Goal  Stayed in bed during groups this evening   Goal: *STG: Complies with medication therapy  Pt will take medications as ordered daily while hospitalized. Outcome: Progressing Towards Goal  Takes medication as prescribed. Comments: Patient has been isolated to room for the entire evening shift. Patient stated that she was \"ok. \"  Patient Jairo Wright about taking prescribed antibiotics stated that she believes that they are causing her to be nauseous. Patient was educated on medication and encouraged to come to day area to receive a snack to help prevent nausea. Patient indicated that she was given crackers for said nausea. Encouraged patient to follow up the on coming physician in the morning to address her concerns. Patient agreed with plan. Patient denied SI/HI at current. All needs assessed. Will continue to monitor and provide support as needed.

## 2018-04-28 NOTE — PROGRESS NOTES
[address]  Inpatient Progress Note     Date of Service: 04/28/18  Hospital Day: 2     Subjective/Interval History   04/28/18    Treatment Team Notes:  Notes reviewed and/or discussed and report that Marcia Pina is demonstrated no interval concerns. Patient interview: Marcia Pina was interviewed by this writer today. Pt reports feeling better since restarting her home medications. Pt is medication compliant and denies medication side effects including TD, EPS, akathisia and mood derangements. Pt shared she is eating and sleeping well. Pt denies SI, HI and AVH. Objective     Visit Vitals    BP (!) 132/94 (BP 1 Location: Right arm, BP Patient Position: Sitting)    Pulse (!) 108    Temp 97.8 °F (36.6 °C)    Resp 16    Ht 5' 4\" (1.626 m)    Wt 94.3 kg (208 lb)    BMI 35.7 kg/m2       Mental Status Examination     Appearance/Hygiene 29 y.o. AAF with good hygiene. Behavior/Social Relatedness Appropriate   Musculoskeletal Gait/Station: not assessed, in bed.    Tone (flaccid, cogwheeling, spastic): not assessed  Psychomotor (hyperkinetic, hypokinetic): appropriate   Involuntary movements (tics, dyskinesias, akathisia, stereotypies): none   Speech   Rate, rhythm, volume, fluency and articulation are appropriate   Mood   restricted   Affect    restricted   Thought Process Linear and goal directed   Thought Content and Perceptual Disturbances Denies self-injurious behavior (SIB), suicidal ideation (SI), aggressive behavior or homicidal ideation (HI)    Denies auditory and visual hallucinations   Sensorium and Cognition  Grossly intact   Insight  fair   Judgment fair        Assessment/Plan      Psychiatric Diagnoses:   Anxiety F41.9   Bipolar 1 disorder, depressed, severe (HCC) F31.4      Medical Diagnoses:        Patient Active Problem List   Diagnosis Code    Vitamin B12 deficiency E53.8    Vitamin D deficiency E55.9    Trichomonas vaginalis infection A59.9    Anxiety F41.9    Bipolar 1 disorder, depressed, severe (HCC) F31.4      Psychosocial and contextual factors:   1. Medication non-compliance and treatment non-compliance due to loss of health insurance.      Level of impairment/disability: Shawn Tellez is a 29 y.o. who is currently improving. Denies SI, HI and AVH. 1.  Continue current treatment plan. 2.  Reviewed instructions, risks, benefits and side effects of medications  3. Disposition/Discharge Date: self-care/home, 5-1-2018.      Austin Lange MD DR. [facility]  Psychiatry

## 2018-04-28 NOTE — BH NOTES
GROUP THERAPY PROGRESS NOTE    Patient is participating in Recreational Group    Group time: 45 minutes    Therapeutic interventions reviewed and discussed: Patients and Staff went outside for fresh air and games. Patients played corn Acquia, ping pong and enjoyed sitting outside in the warm air.

## 2018-04-28 NOTE — BH NOTES
LAVELLE HURST Notes: pt has been isolated in her room the entire shift and only coming out for meals and visitations. Pt tolearated melas on shift. Pt did not participated in groups in shift. Pt was not management problem on shift and will continue to be monitored for safety precautions and locations.

## 2018-04-28 NOTE — BH NOTES
Jeffy Mensah  Is not participating in  Self Expression Group. Group time: 1 hour    Personal goal for participation: express what you're going through    Goal orientation: refuse    Group therapy participation: non- active    Therapeutic interventions reviewed and discussed: The  Card game with questions you always wanted to ask and questions you hope someone will ask you. Impression of participation: Pt  refuse chose to rest in bed despite staff encouragement.

## 2018-04-28 NOTE — PROGRESS NOTES
Patient given Ativan 1 mg po for c/o anxiety; stated that she suddenly felt anxious and does not know the cause; will monitor and maintain safe environment.

## 2018-04-29 PROCEDURE — 65220000001 HC RM PRIVATE PSYCH

## 2018-04-29 PROCEDURE — 74011250637 HC RX REV CODE- 250/637: Performed by: PSYCHIATRY & NEUROLOGY

## 2018-04-29 RX ORDER — LITHIUM CARBONATE 300 MG
300 TABLET ORAL 2 TIMES DAILY
Status: DISCONTINUED | OUTPATIENT
Start: 2018-04-30 | End: 2018-05-01 | Stop reason: HOSPADM

## 2018-04-29 RX ADMIN — CEPHALEXIN 500 MG: 250 CAPSULE ORAL at 22:04

## 2018-04-29 RX ADMIN — BENZTROPINE MESYLATE 0.5 MG: 1 TABLET ORAL at 22:04

## 2018-04-29 RX ADMIN — BENZTROPINE MESYLATE 0.5 MG: 1 TABLET ORAL at 08:32

## 2018-04-29 RX ADMIN — CEPHALEXIN 500 MG: 250 CAPSULE ORAL at 08:32

## 2018-04-29 RX ADMIN — SERTRALINE HYDROCHLORIDE 50 MG: 50 TABLET ORAL at 08:36

## 2018-04-29 RX ADMIN — LITHIUM CARBONATE 300 MG: 300 TABLET ORAL at 13:55

## 2018-04-29 RX ADMIN — LURASIDONE HYDROCHLORIDE 20 MG: 20 TABLET, FILM COATED ORAL at 08:33

## 2018-04-29 RX ADMIN — LITHIUM CARBONATE 300 MG: 300 TABLET ORAL at 08:33

## 2018-04-29 NOTE — PROGRESS NOTES
[address]  Inpatient Progress Note     Date of Service: 04/29/18  Hospital Day: 3     Subjective/Interval History   04/29/18    Treatment Team Notes:  Notes reviewed and/or discussed and report that Neyda Samuels is demonstrated no interval concerns. Remains isolated to her room. Patient interview: Neyda Samuels was interviewed by this writer today. Improving. She continues to feels medications are helpful. Pt is medication compliant and denies medication side effects including TD, EPS, akathisia and mood derangements. Pt shared she is eating and sleeping well. Pt denies SI, HI and AVH. Objective     Visit Vitals    /90    Pulse (!) 101    Temp 97.7 °F (36.5 °C)    Resp 16    Ht 5' 4\" (1.626 m)    Wt 94.3 kg (208 lb)    BMI 35.7 kg/m2       Mental Status Examination     Appearance/Hygiene 29 y.o. AAF with good hygiene. Behavior/Social Relatedness Appropriate   Musculoskeletal Gait/Station: not assessed, in bed.    Tone (flaccid, cogwheeling, spastic): not assessed  Psychomotor (hyperkinetic, hypokinetic): appropriate   Involuntary movements (tics, dyskinesias, akathisia, stereotypies): none   Speech   Rate, rhythm, volume, fluency and articulation are appropriate   Mood   flat   Affect    flat   Thought Process Linear and goal directed   Thought Content and Perceptual Disturbances Denies self-injurious behavior (SIB), suicidal ideation (SI), aggressive behavior or homicidal ideation (HI)    Denies auditory and visual hallucinations   Sensorium and Cognition  Grossly intact   Insight  fair   Judgment fair        Assessment/Plan      Psychiatric Diagnoses:   Anxiety F41.9   Bipolar 1 disorder, depressed, severe (HCC) F31.4      Medical Diagnoses:        Patient Active Problem List   Diagnosis Code    Vitamin B12 deficiency E53.8    Vitamin D deficiency E55.9    Trichomonas vaginalis infection A59.9    Anxiety F41.9    Bipolar 1 disorder, depressed, severe ([facility] [address] F31.4      Psychosocial and contextual factors:   1. Medication non-compliance and treatment non-compliance due to loss of health insurance.      Level of impairment/disability: Moderate      Zoila Barney is a 29 y.o. who is currently improving. Denies SI, HI and AVH. 1.  Continue current treatment plan. 2.  Reviewed instructions, risks, benefits and side effects of medications  3. Disposition/Discharge Date: self-care/home, 5-1-2018.      Sathish Layton MD DR. [facility]  Psychiatry

## 2018-04-29 NOTE — PROGRESS NOTES
Problem: Falls - Risk of  Goal: *Absence of Falls  Document Gagandeep Fall Risk and appropriate interventions in the flowsheet. Pt will remain free of falls daily while hospitalized. Outcome: Progressing Towards Goal  Armando Rice  Fall risk completed and interventions maintained; no fall during shift of care. Fall Risk Interventions:      Problem: Depressed Mood (Adult/Pediatric)  Goal: *STG: Remains safe in hospital  Pt will remain safe and exhibit no evidence of unsafe behaviors daily while hospitalized. Outcome: Progressing Towards Goal  Has remained safe and free of harm during shift of care. Goal: *STG: Complies with medication therapy  Pt will take medications as ordered daily while hospitalized. Outcome: Progressing Towards Goal  Compliant with daily medication regiment. Comments: Patient appears hygienic. Patient presents with a dull affect and depressed mood. Patient had visitors during evening shift; positive interaction observed. Patient participated with group activities. Patient denied SI/HI and AVH during 1:1. Patient was compliant with daily medications. Patient has not engaged in any hazardous activities that may result in a fall or injury.

## 2018-04-29 NOTE — BH NOTES
Patient requested assistance to ambulate back to room following visitation. Patient has c/o light headedness and blurred vision; reason for staying in bed. Patient says this has been going for a couple of days. Patient educated on safety when changing positions, and the importance of notifying the MD and staff regarding changes in condition. Patient's VSS. Patient's MD was notified.

## 2018-04-29 NOTE — PROGRESS NOTES
Problem: Depressed Mood (Adult/Pediatric)  Goal: *STG: Participates in 1:1 therapy sessions  Pt will speak with staff and MD daily while hospitalized. Outcome: Progressing Towards Goal  aeb patient cooperation with nurse encounter to inquire how she was progressing  Goal: *STG: Complies with medication therapy  Pt will take medications as ordered daily while hospitalized. Outcome: Progressing Towards Goal  aeb patient taking medications as ordered    Comments: Patient is alert and oriented x 3. Dull and flat affect. She was up for breakfast and med pass. Denies suicidal and homicidal ideations and no hallucinations. She was guarded and seemed uninterested. States, \"i am doing ok. I don't need anything Ma'am.\" Staff continues to monitor for safety and provide a supportive environment.

## 2018-04-29 NOTE — BH NOTES
Cindy Miguel is not participating in Recreational Therapy. Group time: 1293    Personal goal for participation: fresh air break    Goal orientation: social    Group therapy participation: refuse    Therapeutic interventions reviewed and discussed: Staff encouraged Pt.  To participate in group    Impression of participation: Pt refuse, chose to rest in bed despite staff encouragement

## 2018-04-30 PROCEDURE — 74011250637 HC RX REV CODE- 250/637: Performed by: PSYCHIATRY & NEUROLOGY

## 2018-04-30 PROCEDURE — 65220000001 HC RM PRIVATE PSYCH

## 2018-04-30 RX ADMIN — SERTRALINE HYDROCHLORIDE 50 MG: 50 TABLET ORAL at 08:58

## 2018-04-30 RX ADMIN — BENZTROPINE MESYLATE 0.5 MG: 1 TABLET ORAL at 21:32

## 2018-04-30 RX ADMIN — LITHIUM CARBONATE 300 MG: 300 TABLET ORAL at 08:57

## 2018-04-30 RX ADMIN — LURASIDONE HYDROCHLORIDE 20 MG: 20 TABLET, FILM COATED ORAL at 17:18

## 2018-04-30 RX ADMIN — LITHIUM CARBONATE 300 MG: 300 TABLET ORAL at 21:31

## 2018-04-30 RX ADMIN — LORAZEPAM 2 MG: 1 TABLET ORAL at 17:18

## 2018-04-30 RX ADMIN — BENZTROPINE MESYLATE 0.5 MG: 1 TABLET ORAL at 08:58

## 2018-04-30 RX ADMIN — CEPHALEXIN 500 MG: 250 CAPSULE ORAL at 08:57

## 2018-04-30 RX ADMIN — CEPHALEXIN 500 MG: 250 CAPSULE ORAL at 21:31

## 2018-04-30 RX ADMIN — LURASIDONE HYDROCHLORIDE 20 MG: 20 TABLET, FILM COATED ORAL at 08:57

## 2018-04-30 NOTE — BSMART NOTE
ACTIVITIES THERAPY PROGRESS NOTE    Group time:1530    The patient declined group. In bed. Told nursing staff (by report) she didn't feel well.

## 2018-04-30 NOTE — BH NOTES
Patient's time spent in milieu, vs in bed, was increased during this shift. Patient had family during visitation hours; positive interaction observed. Patient been compliant with medication regiment. Denied SI/HI and AVH during 1:1. Patient has not engaged in any hazardous activities that may result in fall or injury.

## 2018-04-30 NOTE — BSMART NOTE
SOCIAL WORK GROUP THERAPY PROGRESS NOTE    Group Time:  11AM    Group Topic:  Coping Skills    Group Participation:     Pt expressed not interested in attending session despite staff support

## 2018-04-30 NOTE — BSMART NOTE
SW assessment/Intervention:  Chart reviewed and discussed. SW made contact with patient as she remained in bed. Patient reports she is feeling much better and reports so major complaints. Patient denies SI/HI. Patient denies AVH. Patient reports she informed the treating psychiatrist that she has flu like symptoms. Patient was encouraged to attend group activities as well as engage in positive peer interaction.     MILAGRO Jackson    [phone]

## 2018-04-30 NOTE — BH NOTES
Pt had spent time in room up until visiting hour when her family came to visit with her. She was unkempt   in her appearance remaining in hospital gowns from this morning. She was only responsive when directly approached. by staff. Encouraged to seek out staff if any issues to be discussed.

## 2018-04-30 NOTE — BH NOTES
GROUP THERAPY PROGRESS NOTE    Katy Donohue is participating in [employer]. Group time: 30 minutes    Personal goal for participation:Discussed Unit Guidelines    Goal orientation:community  Group therapy participation:minimqal  Therapeutic interventions reviewed and discussed:     Impression of participation: Pt did sit among peers in group and appeared to be listening.

## 2018-04-30 NOTE — PROGRESS NOTES
Problem: Falls - Risk of  Goal: *Absence of Falls  Document Gagandeep Fall Risk and appropriate interventions in the flowsheet. Pt will remain free of falls daily while hospitalized. Outcome: Progressing Towards Goal  Fall Risk Interventions:  Medication Interventions: Teach patient to arise slowly  Pt remains free of falls. Problem: Depressed Mood (Adult/Pediatric)  Goal: *STG: Attends activities and groups  Pt will attend at least 3 groups daily while hospitalized. Outcome: Not Progressing Towards Goal  Pt is not attending groups today. Goal: *STG: Complies with medication therapy  Pt will take medications as ordered daily while hospitalized. Outcome: Progressing Towards Goal  Pt takes medications as ordered. Comments: Patient has been mainly in her room this morning bit did come out for breakfast and medications. Patient states that she is not feeling well today, and describes symptoms that could be anxiety. Patient was informed that she was getting scheduled medications that should help with anxiety and to please come back out in about an hour if they did not help her feeling of \"not feeling well\". Patient has been resting in her room since and has not come out for additional medications. Patient denies active SI/HI and AVH but does still appear sad and endorses depression. Patient states she was actually feeling a little better this weekend than she is today.

## 2018-04-30 NOTE — PROGRESS NOTES
[address]  Inpatient Progress Note     Date of Service: 18  Hospital Day: 4     Subjective/Interval History   18    Treatment Team Notes:  Notes reviewed and/or discussed and report that Kanchan Berger is demonstrated no interval concerns. C/o lightheadedness and dizziness. On call MD made aware (this provider) and Lithium decreased to BID from TID. Patient interview: Kanchan Berger was interviewed by this writer today. Improving. She continues to feels medications are helpful. Pt is medication compliant and denies medication side effects including TD, EPS, akathisia and mood derangements. Pt shared she is eating and sleeping well. Pt denies SI, HI and AVH. Objective     Visit Vitals    /90 (BP 1 Location: Right arm, BP Patient Position: Sitting)    Pulse (!) 106    Temp 97.1 °F (36.2 °C)    Resp 16    Ht 5' 4\" (1.626 m)    Wt 94.3 kg (208 lb)    BMI 35.7 kg/m2       Mental Status Examination     Appearance/Hygiene 29 y.o. AAF with good hygiene. Behavior/Social Relatedness Appropriate   Musculoskeletal Gait/Station: not assessed, in bed.    Tone (flaccid, cogwheeling, spastic): not assessed  Psychomotor (hyperkinetic, hypokinetic): appropriate   Involuntary movements (tics, dyskinesias, akathisia, stereotypies): none   Speech   Rate, rhythm, volume, fluency and articulation are appropriate   Mood   flat   Affect    flat   Thought Process Linear and goal directed   Thought Content and Perceptual Disturbances Denies self-injurious behavior (SIB), suicidal ideation (SI), aggressive behavior or homicidal ideation (HI)    Denies auditory and visual hallucinations   Sensorium and Cognition  Grossly intact   Insight  fair   Judgment fair        Assessment/Plan      Psychiatric Diagnoses:   Anxiety F41.9   Bipolar 1 disorder, depressed, severe (HCC) F31.4      Medical Diagnoses:        Patient Active Problem List   Diagnosis Code  Vitamin B12 deficiency E53.8    Vitamin D deficiency E55.9    Trichomonas vaginalis infection A59.9    Anxiety F41.9    Bipolar 1 disorder, depressed, severe (HCC) F31.4      Psychosocial and contextual factors:   1. Medication non-compliance and treatment non-compliance due to loss of health insurance.      Level of impairment/disability: Shawn Crane is a 29 y.o. who is currently improving. Due to decrease in Lithium, pt will need a Lithium level on 5-1-2018 and expect discharge thereafter. Denies SI, HI and AVH. 1.  Bipolar 1 disorder, depressed   -  Decrease lithium to 300mg po BID   -  Increase Latuda to 40mg po Qbreakfast.     -  Continue sertraline 50mg po Qam.   2.  Reviewed instructions, risks, benefits and side effects of medications  3. Disposition/Discharge Date: self-care/home, 5-1-2018.      Margot Hodgkins, MD DR. [facility]  Psychiatry

## 2018-05-01 VITALS
SYSTOLIC BLOOD PRESSURE: 139 MMHG | BODY MASS INDEX: 35.51 KG/M2 | RESPIRATION RATE: 16 BRPM | HEIGHT: 64 IN | TEMPERATURE: 99 F | HEART RATE: 99 BPM | DIASTOLIC BLOOD PRESSURE: 88 MMHG | WEIGHT: 208 LBS

## 2018-05-01 LAB — LITHIUM SERPL-SCNC: 0.43 MMOL/L (ref 0.6–1.2)

## 2018-05-01 PROCEDURE — 74011250637 HC RX REV CODE- 250/637: Performed by: PSYCHIATRY & NEUROLOGY

## 2018-05-01 PROCEDURE — 36415 COLL VENOUS BLD VENIPUNCTURE: CPT | Performed by: PSYCHIATRY & NEUROLOGY

## 2018-05-01 PROCEDURE — 80178 ASSAY OF LITHIUM: CPT | Performed by: PSYCHIATRY & NEUROLOGY

## 2018-05-01 RX ORDER — HYDROXYZINE PAMOATE 25 MG/1
25 CAPSULE ORAL
Qty: 45 CAP | Refills: 0 | Status: SHIPPED | OUTPATIENT
Start: 2018-05-01 | End: 2018-05-10

## 2018-05-01 RX ORDER — SERTRALINE HYDROCHLORIDE 50 MG/1
50 TABLET, FILM COATED ORAL DAILY
Qty: 30 TAB | Refills: 0 | Status: SHIPPED | OUTPATIENT
Start: 2018-05-01

## 2018-05-01 RX ORDER — BENZTROPINE MESYLATE 0.5 MG/1
0.5 TABLET ORAL 2 TIMES DAILY
Qty: 60 TAB | Refills: 0 | Status: SHIPPED | OUTPATIENT
Start: 2018-05-01

## 2018-05-01 RX ORDER — LITHIUM CARBONATE 300 MG
300 TABLET ORAL 2 TIMES DAILY
Qty: 60 TAB | Refills: 0 | Status: SHIPPED | OUTPATIENT
Start: 2018-05-01

## 2018-05-01 RX ORDER — CEPHALEXIN 500 MG/1
500 CAPSULE ORAL 2 TIMES DAILY
Qty: 5 CAP | Refills: 0 | Status: SHIPPED | OUTPATIENT
Start: 2018-05-01 | End: 2018-05-10

## 2018-05-01 RX ADMIN — LITHIUM CARBONATE 300 MG: 300 TABLET ORAL at 10:13

## 2018-05-01 RX ADMIN — SERTRALINE HYDROCHLORIDE 50 MG: 50 TABLET ORAL at 08:44

## 2018-05-01 RX ADMIN — CEPHALEXIN 500 MG: 250 CAPSULE ORAL at 08:44

## 2018-05-01 RX ADMIN — BENZTROPINE MESYLATE 0.5 MG: 1 TABLET ORAL at 08:44

## 2018-05-01 NOTE — BH NOTES
Evy Mathur  is not participating in Relaxation Group. Group time: 2614    Personal goal for participation: decrease anxiety    Goal orientation: relaxation    Group therapy participation: refuse    Therapeutic interventions reviewed and discussed: Activities to reduce anxiety    Impression of participation: Pt.  Refuse, chose to rest in bed despite staff encouragement

## 2018-05-01 NOTE — DISCHARGE SUMMARY
[facility]  Inpatient Psychiatry   Discharge Summary     Admit date: 4/26/2018    Discharge date and time: 5/1/2018  1:13 PM    Discharge Physician: Danna Martinez MD    DISCHARGE DIAGNOSES     Psychiatric Diagnoses:   Anxiety F41.9   Bipolar 1 disorder, depressed, severe ([facility] [address] F31.4       Medical Diagnoses:           Patient Active Problem List   Diagnosis Code    Vitamin B12 deficiency E53.8    Vitamin D deficiency E55.9    Trichomonas vaginalis infection A59.9    Anxiety F41.9    Bipolar 1 disorder, depressed, severe ([facility] [address] F31.4       Psychosocial and contextual factors:   1.  Medication non-compliance and treatment non-compliance due to loss of health insurance.   [address] [place] [address] Se presented to the inpatient unit  for inpatient psychiatric hospitalization after developing SI and HI with visions of drowning or starving herself and choking her son with a belt. This occurred after a period of medication non-compliance in the context of not attending her follow-up appointment and not having her medications refilled. She ran out of her medications about 1-2 weeks ago. She notes due to working 2 jobs and a change in her insurance, she was unable to obtain her medications. Her medication regimen included Latuda 20mg, Lithium 450mg bid, Zoloft 100mg, Cogentin 0.5mg bid. She feels medications were helpful. She reports variable mood with increased sleep & decreased appetite. The pt was restarted on her home medications to include Lithium; Latuda; Vistaril; Cogentin and Zoloft. The pt tolerated all medications well without medication side effects other than fatigue related dosing Latuda in the morning. The dosage was subsequently changed to dinnertime dosing. She noted an improvement in her mood. The pt tended to remain in her room. The pt was not a behavioral concern while hospitalized.   she attended some groups, was medication compliant and behaviorally appropriate. Pt denied medication side effects including TD, EPS, akathisia and mood derangements. The pt was also treated for a UTI while hospitalized. She has 5 doses of Kelfex 500mg po BID remaining. The pt was also provided 6 weeks of samples for Latuda 40mg po daily with dinner. .     On  05/01/18, the pt was deemed psychiatrically stable and discharged to home. The pt denied SI, HI and AVH. DISPOSITION/FOLLOW-UP     Disposition: home    Follow-up Appointments:  Patient is scheduled with GAYATHRI Hampton on 5/30/18 @ 1:45pm  [facility]  [phone]:[facility] [phone]:BNO      MEDICATION CHANGES   Outpatient medications:  No current facility-administered medications on file prior to encounter. Current Outpatient Prescriptions on File Prior to Encounter   Medication Sig Dispense Refill    MEDROXYPROGESTERONE ACETATE (DEPO-PROVERA IM) Inject  into the muscle.            Medications discontinued during hospitalization:  Medications Discontinued During This Encounter   Medication Reason    lithium carbonate tablet 300 mg     lurasidone (LATUDA) tablet 20 mg     lurasidone (LATUDA) tablet 20 mg     lurasidone (LATUDA) tablet 40 mg     lurasidone (LATUDA) tablet 40 mg Patient Discharge    lithium carbonate tablet 300 mg Patient Discharge    benztropine (COGENTIN) tablet 0.5 mg Patient Discharge    ondansetron (ZOFRAN ODT) tablet 4 mg Patient Discharge    sertraline (ZOLOFT) tablet 50 mg Patient Discharge    cephALEXin (KEFLEX) capsule 500 mg Patient Discharge    traZODone (DESYREL) tablet 50 mg Patient Discharge    LORazepam (ATIVAN) injection 1-2 mg Patient Discharge    ibuprofen (MOTRIN) tablet 400 mg Patient Discharge    haloperidol lactate (HALDOL) injection 5 mg Patient Discharge    haloperidol (HALDOL) tablet 5 mg Patient Discharge    LORazepam (ATIVAN) tablet 1-2 mg Patient Discharge         Discharged medication:  Discharge Medication List as of 5/1/2018 12:50 PM      START taking these medications    Details   lurasidone (LATUDA) 40 mg tab tablet Take 1 Tab by mouth daily (with dinner). Indications: DEPRESSION ASSOCIATED WITH BIPOLAR DISORDER, Print, Disp-30 Tab, R-0      sertraline (ZOLOFT) 50 mg tablet Take 1 Tab by mouth daily. Indications: DEPRESSION ASSOCIATED WITH BIPOLAR DISORDER, Print, Disp-30 Tab, R-0      cephALEXin (KEFLEX) 500 mg capsule Take 1 Cap by mouth two (2) times a day. Indications: UTI, Print, Disp-5 Cap, R-0      lithium carbonate 300 mg tablet Take 1 Tab by mouth two (2) times a day. Indications: Bipolar Disorder, Print, Disp-60 Tab, R-0      benztropine (COGENTIN) 0.5 mg tablet Take 1 Tab by mouth two (2) times a day. Indications: drug-induced extrapyramidal reaction, Print, Disp-60 Tab, R-0         CONTINUE these medications which have NOT CHANGED    Details   MEDROXYPROGESTERONE ACETATE (DEPO-PROVERA IM) Inject  into the muscle., Historical Med             Instructions, risks (black box warning), benefits and side effects (EPS, TD, NMS) were discussed in detail prior to discharge. Patient denied any adverse medication side effects prior to discharge. LABS/IMAGING DURING ADMISSION     Results for orders placed or performed during the hospital encounter of 04/26/18   LITHIUM   Result Value Ref Range    Lithium level 0.43 (L) 0.6 - 1.2 MMOL/L        DISCHARGE MENTAL STATUS EVALUATION     Appearance/Hygiene 29 y.o. AAF with good hygiene. Behavior/Social Relatedness Appropriate   Musculoskeletal Gait/Station: not assessed, in bed.    Tone (flaccid, cogwheeling, spastic): not assessed  Psychomotor (hyperkinetic, hypokinetic): appropriate   Involuntary movements (tics, dyskinesias, akathisia, stereotypies): none    AIMS/Dillon: 0   Speech                          Rate, rhythm, volume, fluency and articulation are appropriate   Mood                          flat   Affect flat   Thought Process Linear and goal directed   Thought Content and Perceptual Disturbances Denies self-injurious behavior (SIB), suicidal ideation (SI), aggressive behavior or homicidal ideation (HI)     Denies auditory and visual hallucinations   Sensorium and Cognition              Grossly intact   Insight              fair   Judgment fair        SUICIDE RISK ASSESSMENT     [] Admission  [x] Discharge     Key Factors:   Current admission precipitated by suicide attempt?   []  Yes     2    [x]  No     1     Suicide Attempt History  [] Past attempts of high lethality    2 []  Past attempts of low lethality    1 [x]  No previous attempts       0   Suicidal Ideation []  Constant suicidal thoughts      2 []  Intermittent or fleeting suicidal  thoughts  1 [x]  Denies current suicidal thoughts    0   Suicide Plan   []  Has plan with actual OR potential access to planned method    2 []  Has plan without access to planned method      1 [x]  No plan            0   Plan Lethality []  Highly lethal plan (Carbon monoxide, gun, hanging, jumping)    2 []  Moderate lethality of plan          1 [x]  Low lethality of plan (biting, head banging, superficial scratching, pillow over face)  0   Safety Plan Agreement  []  Unwilling OR unable to agree due to impaired reality testing   2   []  Patient is ambivalent and/or guarded      1 [x]  Reliably agrees        0   Current Morbid Thoughts (reunion fantasies, preoccupations with death) []  Constantly     2     []  Frequently    1 [x]  Rarely    0   Elopement Risk  []  High risk     2 []  Moderate risk    1 [x]   Low risk    0   Symptoms    []  Hopeless  []  Helpless  []  Anhedonia   []  Guilt/shame  []  Anger/rage  []  Anxiety  []  Insomnia   []  Agitation   []  Impulsivity  []  5-6 symptoms present    2 []  3-4 symptoms present    1  [x]  0-2 symptoms present    0     Scoring Key:  10 or higher = Imminent Risk (consider 1:1)  4 - 9 = Moderate Risk (consider q 15 minute observation)Attended alcohol, tobacco, prescription and other drug psychoeducation group.   0 - 3 = Low Risk (consider q 30 minute observation)    Total Score: 1  ------------------------------------------------------------------------------------------------------------------  PLEASE ADDRESS THE FOLLOWING 5 ISSUES     Physician's Subjective Appraisal of Risk (check one):  []  Patient replies not trustworthy: several non-verbal cues. []  Patient replies questionable: trustworthy: at least 1 non-verbal cue. [x]  Patient replies appear trustworthy. Family History of Suicide? []  Yes  [x]  No    Protective measures (select all that apply):  [x]  Successful past responses to stress  [x]  Spiritual/Restorationism beliefs  [x]  Capacity for reality testing  [x]  Positive therapeutic relationships  [x]  Social supports/connections  [x]  Positive coping skills  [x]  Frustration tolerance/optimism  []  Children or pets in the home  [x]  Sense of responsibility to family  [x]  Agrees to treatment plan and follow up    Others (list):    High Risk Diagnoses (select all that apply):  [x]  Depression/Bipolar Disorder  []  Dual Diagnosis  []  Cardiovascular Disease  []  Schizophrenia  []  Chronic Pain  []  Epilepsy  []  Cancer  []  Personality Disorder  []  HIV/AIDS  []  Multiple Sclerosis    Dangerousness Assessment (Suicide, homicide, property destruction. ..)    Risk Factors reviewed and risk assessed to be:  [] low  [x] low-moderate  [] moderate   [] moderate-high  [] high     Protection factors reviewed and risk assessed to be:  [x] low  [] low-moderate  [] moderate   [] moderate-high  [] high     Response to treatment and risk assessed to be:  [x] low  [] low-moderate  [] moderate   [] moderate-high  [] high     Support reviewed and risk assessed to be:  [x] low  [] low-moderate  [] moderate   [] moderate-high  [] high     Acceptance of Discharge and outpatient treatment reviewed and risk assessed to be:    [x] low  [] low-moderate  [] moderate   [] moderate-high  [] high   Overall risk assessed to be:  [x] low  [x] low-moderate  [] moderate   [] moderate-high  [] high     Completion of discharge was greater than 30 minutes. Over 50% of today's discharge was geared towards counseling and coordination of care.           Nuria Rai MD  Psychiatry  [facility]

## 2018-05-01 NOTE — BH NOTES
GROUP THERAPY PROGRESS NOTE    Maritzahoracio Hawthorne is not participating in [place]. Pt did not come out for group regarding unit guidelines and goals. She chose to stay in room where she has been sleeping.

## 2018-05-01 NOTE — DISCHARGE INSTRUCTIONS
BEHAVIORAL HEALTH NURSING DISCHARGE NOTE      Emergency Numbers    : [facility]: [phone]  Suicide Prevention Line: [phone] (TALK)      The following personal items collected during your admission are returned to you:   Dental Appliance: Dental Appliances: None  Vision:    Hearing Aid:    Jewelry: Jewelry: None  Clothing: Clothing: Slippers, Jacket/Coat, Pants, Shirt  Other Valuables: Other Valuables: Cell Phone, Purse (locker 113/1)  Valuables sent to safe: Personal Items Sent to Safe: Bear [place], bb&t net spend, va ebt        The discharge information has been reviewed with the patient. The patient verbalized understanding. OSSIANIX Activation    Thank you for requesting access to OSSIANIX. Please follow the instructions below to securely access and download your online medical record. OSSIANIX allows you to send messages to your doctor, view your test results, renew your prescriptions, schedule appointments, and more. How Do I Sign Up? In your internet browser, go to [email or web address])  Click on the First Time User? Click Here link in the Sign In box. You will be redirect to the New Member Sign Up page. Enter your OSSIANIX Access Code exactly as it appears below. You will not need to use this code after youve completed the sign-up process. If you do not sign up before the expiration date, you must request a new code. OSSIANIX Access Code: [email or web address]  Expires: 2018  6:51 PM (This is the date your OSSIANIX access code will )    Enter the last four digits of your Social Security Number (xxxx) and Date of Birth (mm/dd/yyyy) as indicated and click Submit. You will be taken to the next sign-up page. Create a OSSIANIX ID. This will be your OSSIANIX login ID and cannot be changed, so think of one that is secure and easy to remember. Create a OSSIANIX password. You can change your password at any time.   Enter your Password Reset Question and Answer. This can be used at a later time if you forget your password. Enter your e-mail address. You will receive e-mail notification when new information is available in [address] Click Sign Up. You can now view and download portions of your medical record. Click the Tractive link to download a portable copy of your medical information. Additional Information    If you have questions, please visit the Frequently Asked Questions section of the Enbridge website at [email or web address] Remember, Enbridge is NOT to be used for urgent needs. For medical emergencies, dial 911.     Patient armband removed and shredded

## 2018-05-01 NOTE — BH NOTES
Patient is being discharged off the unit with her belongings, discharge paperwork and prescriptions. Patient is getting a ride home from her mother.

## 2018-05-03 ENCOUNTER — HOSPITAL ENCOUNTER (EMERGENCY)
Age: 28
Discharge: HOME | End: 2018-05-03
Attending: EMERGENCY MEDICINE
Payer: SELF-PAY

## 2018-05-03 VITALS
RESPIRATION RATE: 16 BRPM | TEMPERATURE: 97.6 F | HEART RATE: 88 BPM | DIASTOLIC BLOOD PRESSURE: 88 MMHG | SYSTOLIC BLOOD PRESSURE: 135 MMHG | OXYGEN SATURATION: 98 %

## 2018-05-03 DIAGNOSIS — R03.0 ELEVATED BLOOD PRESSURE READING: ICD-10-CM

## 2018-05-03 DIAGNOSIS — H66.90 ACUTE OTITIS MEDIA, UNSPECIFIED OTITIS MEDIA TYPE: Primary | ICD-10-CM

## 2018-05-03 PROCEDURE — 99282 EMERGENCY DEPT VISIT SF MDM: CPT

## 2018-05-03 RX ORDER — IBUPROFEN 800 MG/1
800 TABLET ORAL
Qty: 20 TAB | Refills: 0 | Status: SHIPPED | OUTPATIENT
Start: 2018-05-03 | End: 2018-05-10

## 2018-05-03 RX ORDER — AZITHROMYCIN 250 MG/1
TABLET, FILM COATED ORAL
Qty: 6 TAB | Refills: 0 | Status: SHIPPED | OUTPATIENT
Start: 2018-05-03 | End: 2018-05-08

## 2018-05-03 NOTE — ED PROVIDER NOTES
HPI Comments: Alexandro Moran is a 29year old female who presents to the ED with a c/o left ear pain and states \"I can't hear out of the left ear. \"  Denies trauma. States the symptoms have persisted since yesterday. She has not self mediated this problem, and nothing has made it better or worse. Patient is a 29 y.o. female presenting with ear pain. The history is provided by the patient. History limited by: No communication barrier. Ear Pain    This is a new problem. The current episode started 12 to 24 hours ago. The problem occurs constantly. The problem has not changed since onset. Patient complains that the left ear is affected. There has been no fever. The pain is mild. Past Medical History:   Diagnosis Date    Anxiety     Bipolar 1 disorder, depressed, severe ([facility] [address] 4/26/2018    Bipolar affective disorder ([facility] [address]     being followed by Dr. Jeanna Messina Trichomonas vaginalis infection 8/26/2015    Vitamin B12 deficiency 5/2015    Vitamin D deficiency 5/2015       History reviewed. No pertinent surgical history. Family History:   Problem Relation Age of Onset    Hypertension Mother     Migraines Mother     Bipolar Disorder Sister        Social History     Social History    Marital status:      Spouse name: N/A    Number of children: N/A    Years of education: N/A     Occupational History    Not on file. Social History Main Topics    Smoking status: Never Smoker    Smokeless tobacco: Never Used      Comment: Pt counseled to continue to not smoke.  Alcohol use 0.0 oz/week     0 Standard drinks or equivalent per week      Comment: socially    Drug use: No    Sexual activity: Yes     Partners: Male     Birth control/ protection: Pill, Condom     Other Topics Concern    Not on file     Social History Narrative         ALLERGIES: Amoxicillin and Oxycodone-acetaminophen    Review of Systems   Constitutional: Negative. HENT: Positive for ear pain.     Eyes: Negative. Respiratory: Negative. Cardiovascular: Negative. Gastrointestinal: Negative. Endocrine: Negative. Genitourinary: Negative. Musculoskeletal: Negative. Skin: Negative. Allergic/Immunologic: Negative. Neurological: Negative. Hematological: Negative. Psychiatric/Behavioral: Negative. Vitals:    05/03/18 1251   BP: (!) 138/101   Pulse: 88   Resp: 16   Temp: 97.6 °F (36.4 °C)   SpO2: 98%            Physical Exam   Constitutional: She is oriented to person, place, and time. She appears well-developed and well-nourished. No distress. HENT:   Head: Normocephalic and atraumatic. The Left TM is moderately erythematous and has a concave bulge laterally. The right TM is normal.     Eyes: EOM are normal. Pupils are equal, round, and reactive to light. Neck: Normal range of motion. Neck supple. Cardiovascular: Normal rate, regular rhythm and normal heart sounds. Pulmonary/Chest: Effort normal and breath sounds normal. No respiratory distress. She has no wheezes. She has no rales. Abdominal: Soft. Bowel sounds are normal. There is no tenderness. There is no rebound. Genitourinary:   Genitourinary Comments: NE   Musculoskeletal: Normal range of motion. Neurological: She is alert and oriented to person, place, and time. No cranial nerve deficit. She exhibits normal muscle tone. Coordination normal.   Skin: Skin is warm and dry. Psychiatric: She has a normal mood and affect. Nursing note and vitals reviewed. MDM  Number of Diagnoses or Management Options  Acute otitis media, unspecified otitis media type:   Elevated blood pressure reading:   Diagnosis management comments: MDM:  Will start the Pt on amoxicillin for otitis media. Michelle Kruger NP  1:19 PM    PROGRESS NOTE:  One or more blood pressure readings were noted elevated during the Pt's presentation in the emergency department this date.   This abnormal reading has been cited in the Pt's diagnosis, and they have been encouraged to follow up with their primary care physician, or referred to a consultant for further evaluation and treatment. Jorge A Tejada NP  1:20 PM          Risk of Complications, Morbidity, and/or Mortality  Presenting problems: low  Diagnostic procedures: low  Management options: low    Patient Progress  Patient progress: stable        ED Course       Procedures    Diagnosis:   1. Acute otitis media, unspecified otitis media type    2. Elevated blood pressure reading          Disposition:   Discharged to Home. Follow-up Information     Follow up With Details Comments Contact Info    Ricardo Latif MD Call today to arrange follow up appointment. [facility] [id number]  [phone]            Patient's Medications   Start Taking    AZITHROMYCIN (ZITHROMAX Z-MILAN) 250 MG TABLET    Take two tabs po day one; Take one tab po days two through five. IBUPROFEN (MOTRIN) 800 MG TABLET    Take 1 Tab by mouth every eight (8) hours as needed for Pain for up to 7 days. Continue Taking    BENZTROPINE (COGENTIN) 0.5 MG TABLET    Take 1 Tab by mouth two (2) times a day. Indications: drug-induced extrapyramidal reaction    CEPHALEXIN (KEFLEX) 500 MG CAPSULE    Take 1 Cap by mouth two (2) times a day. Indications: UTI    HYDROXYZINE PAMOATE (VISTARIL) 25 MG CAPSULE    Take 1 Cap by mouth three (3) times daily as needed for Anxiety. Indications: anxiety    LITHIUM CARBONATE 300 MG TABLET    Take 1 Tab by mouth two (2) times a day. Indications: Bipolar Disorder    LURASIDONE (LATUDA) 40 MG TAB TABLET    Take 1 Tab by mouth daily (with dinner). Indications: DEPRESSION ASSOCIATED WITH BIPOLAR DISORDER    MEDROXYPROGESTERONE ACETATE (DEPO-PROVERA IM)    Inject  into the muscle. SERTRALINE (ZOLOFT) 50 MG TABLET    Take 1 Tab by mouth daily.  Indications: DEPRESSION ASSOCIATED WITH BIPOLAR DISORDER   These Medications have changed    No medications on file   Stop Taking    No medications on file

## 2018-05-03 NOTE — DISCHARGE INSTRUCTIONS
Novi Activation    Thank you for requesting access to Novi. Please follow the instructions below to securely access and download your online medical record. Novi allows you to send messages to your doctor, view your test results, renew your prescriptions, schedule appointments, and more. How Do I Sign Up? 1. In your internet browser, go to [email or web address]  2. Click on the First Time User? Click Here link in the Sign In box. You will be redirect to the New Member Sign Up page. 3. Enter your Novi Access Code exactly as it appears below. You will not need to use this code after youve completed the sign-up process. If you do not sign up before the expiration date, you must request a new code. Novi Access Code: [email or web address]  Expires: 2018  6:51 PM (This is the date your Novi access code will )    4. Enter the last four digits of your Social Security Number (xxxx) and Date of Birth (mm/dd/yyyy) as indicated and click Submit. You will be taken to the next sign-up page. 5. Create a Novi ID. This will be your Novi login ID and cannot be changed, so think of one that is secure and easy to remember. 6. Create a Novi password. You can change your password at any time. 7. Enter your Password Reset Question and Answer. This can be used at a later time if you forget your password. 8. Enter your e-mail address. You will receive e-mail notification when new information is available in [address] 9. Click Sign Up. You can now view and download portions of your medical record. 10. Click the Download Summary menu link to download a portable copy of your medical information. Additional Information    If you have questions, please visit the Frequently Asked Questions section of the Novi website at [email or web address] Remember, Novi is NOT to be used for urgent needs. For medical emergencies, dial 911.

## 2018-05-10 ENCOUNTER — HOSPITAL ENCOUNTER (EMERGENCY)
Age: 28
Discharge: HOME | End: 2018-05-11
Attending: EMERGENCY MEDICINE
Payer: SELF-PAY

## 2018-05-10 DIAGNOSIS — R03.0 ELEVATED BLOOD PRESSURE READING: ICD-10-CM

## 2018-05-10 DIAGNOSIS — M62.82 NON-TRAUMATIC RHABDOMYOLYSIS: Primary | ICD-10-CM

## 2018-05-10 DIAGNOSIS — N39.0 ACUTE UTI: ICD-10-CM

## 2018-05-10 LAB
ALBUMIN SERPL-MCNC: 2.7 G/DL (ref 3.4–5)
ALBUMIN/GLOB SERPL: 0.7 {RATIO} (ref 0.8–1.7)
ALP SERPL-CCNC: 105 U/L (ref 45–117)
ALT SERPL-CCNC: 177 U/L (ref 13–56)
ANION GAP SERPL CALC-SCNC: 7 MMOL/L (ref 3–18)
APPEARANCE UR: ABNORMAL
AST SERPL-CCNC: 219 U/L (ref 15–37)
BACTERIA URNS QL MICRO: ABNORMAL /HPF
BASOPHILS # BLD: 0.1 K/UL (ref 0–0.06)
BASOPHILS NFR BLD: 1 % (ref 0–2)
BILIRUB DIRECT SERPL-MCNC: 0.3 MG/DL (ref 0–0.2)
BILIRUB SERPL-MCNC: 0.6 MG/DL (ref 0.2–1)
BILIRUB UR QL: ABNORMAL
BUN SERPL-MCNC: 8 MG/DL (ref 7–18)
BUN/CREAT SERPL: 11 (ref 12–20)
CALCIUM SERPL-MCNC: 8.5 MG/DL (ref 8.5–10.1)
CHLORIDE SERPL-SCNC: 100 MMOL/L (ref 100–108)
CK SERPL-CCNC: 1575 U/L (ref 26–192)
CO2 SERPL-SCNC: 28 MMOL/L (ref 21–32)
COLOR UR: ABNORMAL
CREAT SERPL-MCNC: 0.72 MG/DL (ref 0.6–1.3)
CRP SERPL-MCNC: 1.9 MG/DL (ref 0–0.3)
DIFFERENTIAL METHOD BLD: ABNORMAL
EOSINOPHIL # BLD: 0.1 K/UL (ref 0–0.4)
EOSINOPHIL NFR BLD: 1 % (ref 0–5)
EPITH CASTS URNS QL MICRO: ABNORMAL /LPF (ref 0–5)
ERYTHROCYTE [DISTWIDTH] IN BLOOD BY AUTOMATED COUNT: 17 % (ref 11.6–14.5)
ERYTHROCYTE [SEDIMENTATION RATE] IN BLOOD: 1 MM/HR (ref 0–20)
ETHANOL SERPL-MCNC: <3 MG/DL (ref 0–3)
GLOBULIN SER CALC-MCNC: 4 G/DL (ref 2–4)
GLUCOSE SERPL-MCNC: 121 MG/DL (ref 74–99)
GLUCOSE UR STRIP.AUTO-MCNC: NEGATIVE MG/DL
HCG SERPL QL: NEGATIVE
HCT VFR BLD AUTO: 43.1 % (ref 35–45)
HGB BLD-MCNC: 15.1 G/DL (ref 12–16)
HGB UR QL STRIP: ABNORMAL
KETONES UR QL STRIP.AUTO: ABNORMAL MG/DL
LEUKOCYTE ESTERASE UR QL STRIP.AUTO: ABNORMAL
LITHIUM SERPL-SCNC: 0.41 MMOL/L (ref 0.6–1.2)
LYMPHOCYTES # BLD: 4.4 K/UL (ref 0.9–3.6)
LYMPHOCYTES NFR BLD: 29 % (ref 21–52)
MAGNESIUM SERPL-MCNC: 2.3 MG/DL (ref 1.6–2.6)
MCH RBC QN AUTO: 25.4 PG (ref 24–34)
MCHC RBC AUTO-ENTMCNC: 35 G/DL (ref 31–37)
MCV RBC AUTO: 72.4 FL (ref 74–97)
MONOCYTES # BLD: 1.3 K/UL (ref 0.05–1.2)
MONOCYTES NFR BLD: 9 % (ref 3–10)
NEUTS SEG # BLD: 9.1 K/UL (ref 1.8–8)
NEUTS SEG NFR BLD: 60 % (ref 40–73)
NITRITE UR QL STRIP.AUTO: NEGATIVE
PH UR STRIP: 6.5 [PH] (ref 5–8)
PLATELET # BLD AUTO: 266 K/UL (ref 135–420)
PMV BLD AUTO: 10.4 FL (ref 9.2–11.8)
POTASSIUM SERPL-SCNC: 4 MMOL/L (ref 3.5–5.5)
PROT SERPL-MCNC: 6.7 G/DL (ref 6.4–8.2)
PROT UR STRIP-MCNC: 30 MG/DL
RBC # BLD AUTO: 5.95 M/UL (ref 4.2–5.3)
RBC #/AREA URNS HPF: ABNORMAL /HPF (ref 0–5)
SODIUM SERPL-SCNC: 135 MMOL/L (ref 136–145)
SP GR UR REFRACTOMETRY: 1.03 (ref 1–1.03)
UROBILINOGEN UR QL STRIP.AUTO: 1 EU/DL (ref 0.2–1)
VIT B12 SERPL-MCNC: 207 PG/ML (ref 211–911)
WBC # BLD AUTO: 14.9 K/UL (ref 4.6–13.2)
WBC URNS QL MICRO: ABNORMAL /HPF (ref 0–4)

## 2018-05-10 PROCEDURE — 85652 RBC SED RATE AUTOMATED: CPT | Performed by: EMERGENCY MEDICINE

## 2018-05-10 PROCEDURE — 84703 CHORIONIC GONADOTROPIN ASSAY: CPT | Performed by: EMERGENCY MEDICINE

## 2018-05-10 PROCEDURE — 87077 CULTURE AEROBIC IDENTIFY: CPT | Performed by: EMERGENCY MEDICINE

## 2018-05-10 PROCEDURE — 96360 HYDRATION IV INFUSION INIT: CPT

## 2018-05-10 PROCEDURE — 87106 FUNGI IDENTIFICATION YEAST: CPT | Performed by: EMERGENCY MEDICINE

## 2018-05-10 PROCEDURE — 99283 EMERGENCY DEPT VISIT LOW MDM: CPT

## 2018-05-10 PROCEDURE — 82550 ASSAY OF CK (CPK): CPT | Performed by: EMERGENCY MEDICINE

## 2018-05-10 PROCEDURE — 77030011943

## 2018-05-10 PROCEDURE — 82306 VITAMIN D 25 HYDROXY: CPT | Performed by: EMERGENCY MEDICINE

## 2018-05-10 PROCEDURE — 82607 VITAMIN B-12: CPT | Performed by: EMERGENCY MEDICINE

## 2018-05-10 PROCEDURE — 80048 BASIC METABOLIC PNL TOTAL CA: CPT | Performed by: EMERGENCY MEDICINE

## 2018-05-10 PROCEDURE — 83735 ASSAY OF MAGNESIUM: CPT | Performed by: EMERGENCY MEDICINE

## 2018-05-10 PROCEDURE — 80076 HEPATIC FUNCTION PANEL: CPT | Performed by: EMERGENCY MEDICINE

## 2018-05-10 PROCEDURE — 85025 COMPLETE CBC W/AUTO DIFF WBC: CPT | Performed by: EMERGENCY MEDICINE

## 2018-05-10 PROCEDURE — 80178 ASSAY OF LITHIUM: CPT | Performed by: EMERGENCY MEDICINE

## 2018-05-10 PROCEDURE — 96361 HYDRATE IV INFUSION ADD-ON: CPT

## 2018-05-10 PROCEDURE — 80307 DRUG TEST PRSMV CHEM ANLYZR: CPT | Performed by: EMERGENCY MEDICINE

## 2018-05-10 PROCEDURE — 81001 URINALYSIS AUTO W/SCOPE: CPT | Performed by: EMERGENCY MEDICINE

## 2018-05-10 PROCEDURE — 86140 C-REACTIVE PROTEIN: CPT | Performed by: EMERGENCY MEDICINE

## 2018-05-10 PROCEDURE — 74011250636 HC RX REV CODE- 250/636: Performed by: EMERGENCY MEDICINE

## 2018-05-10 PROCEDURE — 87186 SC STD MICRODIL/AGAR DIL: CPT | Performed by: EMERGENCY MEDICINE

## 2018-05-10 PROCEDURE — 87086 URINE CULTURE/COLONY COUNT: CPT | Performed by: EMERGENCY MEDICINE

## 2018-05-10 RX ORDER — ONDANSETRON 2 MG/ML
4 INJECTION INTRAMUSCULAR; INTRAVENOUS
Status: DISCONTINUED | OUTPATIENT
Start: 2018-05-10 | End: 2018-05-11 | Stop reason: HOSPADM

## 2018-05-10 RX ADMIN — SODIUM CHLORIDE 2000 ML: 900 INJECTION, SOLUTION INTRAVENOUS at 22:23

## 2018-05-10 RX ADMIN — SODIUM CHLORIDE 1000 ML: 900 INJECTION, SOLUTION INTRAVENOUS at 20:40

## 2018-05-10 NOTE — LETTER
NOTIFICATION RETURN TO WORK / SCHOOL 
 
5/11/2018 2:42 AM 
 
Ms. Vee Villalpando [address]  # [address] [facility] [address] [id number] To Whom It May Concern: 
 
Vee Villalpando is currently under the care of [facility] EMERGENCY DEPT. She will return to work/school on: 5/12/18. If there are questions or concerns please have the patient contact our office.  
 
 
 
Sincerely, 
 
 
 
 
Liliane Zee PA-C

## 2018-05-10 NOTE — ED TRIAGE NOTES
Multiple complaints including dark urine abdominal pain, shoulder and back pain, loss of peripheral vision. Started while inpt at [facility]. Told was from meds treating UTI.

## 2018-05-10 NOTE — ED PROVIDER NOTES
EMERGENCY DEPARTMENT HISTORY AND PHYSICAL EXAM    Date: 5/10/2018  Patient Name: Cady Tyler    History of Presenting Illness     Chief Complaint   Patient presents with    Other         History Provided By: Patient    Chief Complaint: myalgias  Duration: 3-4 Days  Timing:  Acute  Location: BLE  Quality: Aching  Severity: Moderate  Modifying Factors: none  Associated Symptoms: nausea, vomited twice in past 48hrs      Additional History (Context): Cady Tyler is a 29 y.o. female with bipolar; Vit B12 and D deficiencies, migraines who presents with myalgias, low back pain, and 'tunnel vision'. Myalgias began first.  Compliant with bipolar meds since discharge from [facility] 2d ago. Denies diarrhea, fever, rash, abd pain, CP, SOB. PCP: Kian James MD    Current Facility-Administered Medications   Medication Dose Route Frequency Provider Last Rate Last Dose    ondansetron (ZOFRAN) injection 4 mg  4 mg IntraVENous NOW GAYATHRI Saba         Current Outpatient Prescriptions   Medication Sig Dispense Refill    ciprofloxacin HCl (CIPRO) 500 mg tablet Take 1 Tab by mouth two (2) times a day for 3 days. 6 Tab 0    lurasidone (LATUDA) 40 mg tab tablet Take 1 Tab by mouth daily (with dinner). Indications: DEPRESSION ASSOCIATED WITH BIPOLAR DISORDER 30 Tab 0    sertraline (ZOLOFT) 50 mg tablet Take 1 Tab by mouth daily. Indications: DEPRESSION ASSOCIATED WITH BIPOLAR DISORDER 30 Tab 0    lithium carbonate 300 mg tablet Take 1 Tab by mouth two (2) times a day. Indications: Bipolar Disorder 60 Tab 0    benztropine (COGENTIN) 0.5 mg tablet Take 1 Tab by mouth two (2) times a day. Indications: drug-induced extrapyramidal reaction 60 Tab 0    MEDROXYPROGESTERONE ACETATE (DEPO-PROVERA IM) Inject  into the muscle.          Past History     Past Medical History:  Past Medical History:   Diagnosis Date    Anxiety     Bipolar 1 disorder, depressed, severe ([address] 4/26/2018    Bipolar affective disorder [facility])     being followed by Dr. Neida Murphy Trichomonas vaginalis infection 8/26/2015    Vitamin B12 deficiency 5/2015    Vitamin D deficiency 5/2015       Past Surgical History:  History reviewed. No pertinent surgical history. Family History:  Family History   Problem Relation Age of Onset    Hypertension Mother    [facility] Migraines Mother     Bipolar Disorder Sister        Social History:  Social History   Substance Use Topics    Smoking status: Never Smoker    Smokeless tobacco: Never Used      Comment: Pt counseled to continue to not smoke.  Alcohol use 0.0 oz/week     0 Standard drinks or equivalent per week      Comment: socially       Allergies: Allergies   Allergen Reactions    Amoxicillin Itching    Oxycodone-Acetaminophen Itching         Review of Systems   Review of Systems   Constitutional: Negative. HENT: Negative. Eyes: Positive for visual disturbance. Respiratory: Negative. Cardiovascular: Negative. Gastrointestinal: Positive for nausea and vomiting. Negative for abdominal pain. Endocrine: Negative. Genitourinary: Negative. Negative for flank pain. Musculoskeletal: Positive for back pain, gait problem and myalgias. Skin: Negative for rash. Allergic/Immunologic: Negative. Neurological: Negative for weakness and headaches. Hematological: Negative. Psychiatric/Behavioral: Negative. Negative for suicidal ideas. All other systems reviewed and are negative. All Other Systems Negative  Physical Exam     Vitals:    05/10/18 1842 05/11/18 0152   BP: (!) 148/111 (!) 141/94   Pulse: (!) 102 98   Resp: 17 14   Temp: 98 °F (36.7 °C) 98.2 °F (36.8 °C)   SpO2: 99% 99%   Weight: 94.3 kg (208 lb)    Height: 5' 4\" (1.626 m)      Physical Exam   Constitutional: She is oriented to person, place, and time. She appears well-developed. She appears distressed. Stiff, not wanting to move   HENT:   Head: Normocephalic and atraumatic.    Eyes: Pupils are equal, round, and reactive to light. Neck: No JVD present. No tracheal deviation present. No thyromegaly present. Cardiovascular: Regular rhythm and normal heart sounds. Exam reveals no gallop and no friction rub. No murmur heard. Tachycardiac rate   Pulmonary/Chest: Effort normal and breath sounds normal. No stridor. No respiratory distress. She has no wheezes. She has no rales. She exhibits no tenderness. Abdominal: Soft. She exhibits no distension and no mass. There is no tenderness. There is no rebound and no guarding. Musculoskeletal: She exhibits no edema or tenderness. Lymphadenopathy:     She has no cervical adenopathy. Neurological: She is alert and oriented to person, place, and time. Skin: Skin is warm and dry. No rash noted. She is not diaphoretic. No erythema. No pallor. Psychiatric: She has a normal mood and affect. Her behavior is normal. Thought content normal.   Nursing note and vitals reviewed. Diagnostic Study Results     Labs -     Recent Results (from the past 12 hour(s))   LITHIUM    Collection Time: 05/10/18  8:24 PM   Result Value Ref Range    Lithium level 0.41 (L) 0.6 - 1.2 MMOL/L   CBC WITH AUTOMATED DIFF    Collection Time: 05/10/18  8:24 PM   Result Value Ref Range    WBC 14.9 (H) 4.6 - 13.2 K/uL    RBC 5.95 (H) 4.20 - 5.30 M/uL    HGB 15.1 12.0 - 16.0 g/dL    HCT 43.1 35.0 - 45.0 %    MCV 72.4 (L) 74.0 - 97.0 FL    MCH 25.4 24.0 - 34.0 PG    MCHC 35.0 31.0 - 37.0 g/dL    RDW 17.0 (H) 11.6 - 14.5 %    PLATELET 400 352 - 675 K/uL    MPV 10.4 9.2 - 11.8 FL    NEUTROPHILS 60 40 - 73 %    LYMPHOCYTES 29 21 - 52 %    MONOCYTES 9 3 - 10 %    EOSINOPHILS 1 0 - 5 %    BASOPHILS 1 0 - 2 %    ABS. NEUTROPHILS 9.1 (H) 1.8 - 8.0 K/UL    ABS. LYMPHOCYTES 4.4 (H) 0.9 - 3.6 K/UL    ABS. MONOCYTES 1.3 (H) 0.05 - 1.2 K/UL    ABS. EOSINOPHILS 0.1 0.0 - 0.4 K/UL    ABS.  BASOPHILS 0.1 (H) 0.0 - 0.06 K/UL    DF AUTOMATED     METABOLIC PANEL, BASIC    Collection Time: 05/10/18  8:24 PM   Result Value Ref Range    Sodium 135 (L) 136 - 145 mmol/L    Potassium 4.0 3.5 - 5.5 mmol/L    Chloride 100 100 - 108 mmol/L    CO2 28 21 - 32 mmol/L    Anion gap 7 3.0 - 18 mmol/L    Glucose 121 (H) 74 - 99 mg/dL    BUN 8 7.0 - 18 MG/DL    Creatinine 0.72 0.6 - 1.3 MG/DL    BUN/Creatinine ratio 11 (L) 12 - 20      GFR est AA >60 >60 ml/min/1.73m2    GFR est non-AA >60 >60 ml/min/1.73m2    Calcium 8.5 8.5 - 10.1 MG/DL   HEPATIC FUNCTION PANEL    Collection Time: 05/10/18  8:24 PM   Result Value Ref Range    Protein, total 6.7 6.4 - 8.2 g/dL    Albumin 2.7 (L) 3.4 - 5.0 g/dL    Globulin 4.0 2.0 - 4.0 g/dL    A-G Ratio 0.7 (L) 0.8 - 1.7      Bilirubin, total 0.6 0.2 - 1.0 MG/DL    Bilirubin, direct 0.3 (H) 0.0 - 0.2 MG/DL    Alk.  phosphatase 105 45 - 117 U/L    AST (SGOT) 219 (H) 15 - 37 U/L    ALT (SGPT) 177 (H) 13 - 56 U/L   HCG QL SERUM    Collection Time: 05/10/18  8:24 PM   Result Value Ref Range    HCG, Ql. NEGATIVE  NEG     ETHYL ALCOHOL    Collection Time: 05/10/18  8:24 PM   Result Value Ref Range    ALCOHOL(ETHYL),SERUM <3 0 - 3 MG/DL   SED RATE (ESR)    Collection Time: 05/10/18  8:24 PM   Result Value Ref Range    Sed rate, automated 1 0 - 20 mm/hr   C REACTIVE PROTEIN, QT    Collection Time: 05/10/18  8:24 PM   Result Value Ref Range    C-Reactive protein 1.9 (H) 0 - 0.3 mg/dL   MAGNESIUM    Collection Time: 05/10/18  8:24 PM   Result Value Ref Range    Magnesium 2.3 1.6 - 2.6 mg/dL   VITAMIN B12    Collection Time: 05/10/18  8:24 PM   Result Value Ref Range    Vitamin B12 207 (L) 211 - 911 pg/mL   CK    Collection Time: 05/10/18  8:24 PM   Result Value Ref Range    CK 1575 (H) 26 - 192 U/L   URINALYSIS W/ RFLX MICROSCOPIC    Collection Time: 05/10/18  8:48 PM   Result Value Ref Range    Color DARK YELLOW      Appearance TURBID      Specific gravity 1.026 1.005 - 1.030      pH (UA) 6.5 5.0 - 8.0      Protein 30 (A) NEG mg/dL    Glucose NEGATIVE  NEG mg/dL    Ketone TRACE (A) NEG mg/dL    Bilirubin SMALL (A) NEG Blood MODERATE (A) NEG      Urobilinogen 1.0 0.2 - 1.0 EU/dL    Nitrites NEGATIVE  NEG      Leukocyte Esterase SMALL (A) NEG     URINE MICROSCOPIC ONLY    Collection Time: 05/10/18  8:48 PM   Result Value Ref Range    WBC 2 to 4 0 - 4 /hpf    RBC 1 to 3 0 - 5 /hpf    Epithelial cells 3+ 0 - 5 /lpf    Bacteria 1+ (A) NEG /hpf   CK    Collection Time: 05/11/18 12:35 AM   Result Value Ref Range    CK 1114 (H) 26 - 192 U/L       Radiologic Studies -   No orders to display     CT Results  (Last 48 hours)    None        CXR Results  (Last 48 hours)    None            Medical Decision Making   I am the first provider for this patient. I reviewed the vital signs, available nursing notes, past medical history, past surgical history, family history and social history. Vital Signs-Reviewed the patient's vital signs. Records Reviewed: Nursing Notes    Procedures:  Procedures    Provider Notes (Medical Decision Making): UTI; rhabdomyolysis; pyelonephritis; atypical migraine    Feeling improved with CK trending down. Given 3L IVF; admits to not drinking water regularly. Treat UTI; send urine for culture. F/up with PCP. MED RECONCILIATION:  Current Facility-Administered Medications   Medication Dose Route Frequency    ondansetron (ZOFRAN) injection 4 mg  4 mg IntraVENous NOW     Current Outpatient Prescriptions   Medication Sig    ciprofloxacin HCl (CIPRO) 500 mg tablet Take 1 Tab by mouth two (2) times a day for 3 days.  lurasidone (LATUDA) 40 mg tab tablet Take 1 Tab by mouth daily (with dinner). Indications: DEPRESSION ASSOCIATED WITH BIPOLAR DISORDER    sertraline (ZOLOFT) 50 mg tablet Take 1 Tab by mouth daily. Indications: DEPRESSION ASSOCIATED WITH BIPOLAR DISORDER    lithium carbonate 300 mg tablet Take 1 Tab by mouth two (2) times a day. Indications: Bipolar Disorder    benztropine (COGENTIN) 0.5 mg tablet Take 1 Tab by mouth two (2) times a day.  Indications: drug-induced extrapyramidal reaction    MEDROXYPROGESTERONE ACETATE (DEPO-PROVERA IM) Inject  into the muscle. Disposition:  home    DISCHARGE NOTE:   2:06 AM    Pt has been reexamined. Patient has no new complaints, changes, or physical findings. Care plan outlined and precautions discussed. Results of cipro were reviewed with the patient. All medications were reviewed with the patient; will d/c home with labs. All of pt's questions and concerns were addressed. Patient was instructed and agrees to follow up with PCP, as well as to return to the ED upon further deterioration. Patient is ready to go home. Follow-up Information     Follow up With Details Comments Contact Info    Ricardo Nur MD Schedule an appointment as soon as possible for a visit today  [address]  [place] [address]  [phone]      [facility] EMERGENCY DEPT  If symptoms worsen return immediately [address]  [phone]          Current Discharge Medication List      START taking these medications    Details   ciprofloxacin HCl (CIPRO) 500 mg tablet Take 1 Tab by mouth two (2) times a day for 3 days. Qty: 6 Tab, Refills: 0             Diagnosis     Clinical Impression:   1. Non-traumatic rhabdomyolysis    2. Acute UTI    3.  Elevated blood pressure reading

## 2018-05-11 VITALS
WEIGHT: 208 LBS | OXYGEN SATURATION: 99 % | SYSTOLIC BLOOD PRESSURE: 141 MMHG | BODY MASS INDEX: 35.51 KG/M2 | HEIGHT: 64 IN | HEART RATE: 98 BPM | DIASTOLIC BLOOD PRESSURE: 94 MMHG | TEMPERATURE: 98.2 F | RESPIRATION RATE: 14 BRPM

## 2018-05-11 LAB — CK SERPL-CCNC: 1114 U/L (ref 26–192)

## 2018-05-11 PROCEDURE — 82550 ASSAY OF CK (CPK): CPT | Performed by: EMERGENCY MEDICINE

## 2018-05-11 PROCEDURE — 96361 HYDRATE IV INFUSION ADD-ON: CPT

## 2018-05-11 RX ORDER — CIPROFLOXACIN 500 MG/1
500 TABLET ORAL 2 TIMES DAILY
Qty: 6 TAB | Refills: 0 | Status: SHIPPED | OUTPATIENT
Start: 2018-05-11 | End: 2018-05-14

## 2018-05-11 NOTE — DISCHARGE INSTRUCTIONS
Rhabdomyolysis: Care Instructions  Your Care Instructions    When you have rhabdomyolysis (say \"rur-dvl-rb-AH-jeromy-suss\"), dying muscle cells cause toxins to build up in the blood. If not treated, it can cause life-threatening damage to the body's organs. It can be caused by many things, such as severe muscle injury, some medicines (like statins), the flu, and certain blood infections. Symptoms may include weak muscles, pain, stiffness, fever, and nausea. Your urine may also be dark. You will get treatment in the hospital. If possible, the doctor will stop the cause of muscle cell death. The doctor will take steps to protect your organs. You may have to stop taking certain medicines if they are the cause of the problem. You will also get treatment to help the kidneys remove the toxins from your blood. This includes plenty of fluids. You may get fluids through a vein (by IV). You may also need dialysis. Follow-up care is a key part of your treatment and safety. Be sure to make and go to all appointments, and call your doctor if you are having problems. It's also a good idea to know your test results and keep a list of the medicines you take. How can you care for yourself at home? · Take pain medicines exactly as directed. ¨ If the doctor gave you a prescription medicine for pain, take it as prescribed. ¨ If you are not taking a prescription pain medicine, ask your doctor if you can take an over-the-counter medicine. · Talk to your doctor about whether you need to stop taking any medicines. Follow your doctor's instructions about stopping medicines. · Drink plenty of fluids, enough so that your urine is light yellow or clear like water. If you have kidney, heart, or liver disease and have to limit fluids, talk with your doctor before you increase the amount of fluids you drink. When should you call for help?   Call your doctor now or seek immediate medical care if:  ? · You have new or worse muscle pain.   ? · You have less urine than normal or no urine. ? · You have new swelling in your arms or feet. ? · You have blood in your urine. ? Watch closely for changes in your health, and be sure to contact your doctor if you do not get better as expected. Where can you learn more? Go to http://adela-navdeep.info/. Enter F129 in the search box to learn more about \"Rhabdomyolysis: Care Instructions. \"  Current as of: May 12, 2017  Content Version: 11.4  © 3455-8162 [employer]. Care instructions adapted under license by [employer] (which disclaims liability or warranty for this information). If you have questions about a medical condition or this instruction, always ask your healthcare professional. [address] any warranty or liability for your use of this information. Urinary Tract Infection in Women: Care Instructions  Your Care Instructions    A urinary tract infection, or UTI, is a general term for an infection anywhere between the kidneys and the urethra (where urine comes out). Most UTIs are bladder infections. They often cause pain or burning when you urinate. UTIs are caused by bacteria and can be cured with antibiotics. Be sure to complete your treatment so that the infection goes away. Follow-up care is a key part of your treatment and safety. Be sure to make and go to all appointments, and call your doctor if you are having problems. It's also a good idea to know your test results and keep a list of the medicines you take. How can you care for yourself at home? · Take your antibiotics as directed. Do not stop taking them just because you feel better. You need to take the full course of antibiotics. · Drink extra water and other fluids for the next day or two. This may help wash out the bacteria that are causing the infection.  (If you have kidney, heart, or liver disease and have to limit fluids, talk with your doctor before you increase your fluid intake.)  · Avoid drinks that are carbonated or have caffeine. They can irritate the bladder. · Urinate often. Try to empty your bladder each time. · To relieve pain, take a hot bath or lay a heating pad set on low over your lower belly or genital area. Never go to sleep with a heating pad in place. To prevent UTIs  · Drink plenty of water each day. This helps you urinate often, which clears bacteria from your system. (If you have kidney, heart, or liver disease and have to limit fluids, talk with your doctor before you increase your fluid intake.)  · Urinate when you need to. · Urinate right after you have sex. · Change sanitary pads often. · Avoid douches, bubble baths, feminine hygiene sprays, and other feminine hygiene products that have deodorants. · After going to the bathroom, wipe from front to back. When should you call for help? Call your doctor now or seek immediate medical care if:  ? · Symptoms such as fever, chills, nausea, or vomiting get worse or appear for the first time. ? · You have new pain in your back just below your rib cage. This is called flank pain. ? · There is new blood or pus in your urine. ? · You have any problems with your antibiotic medicine. ? Watch closely for changes in your health, and be sure to contact your doctor if:  ? · You are not getting better after taking an antibiotic for 2 days. ? · Your symptoms go away but then come back. Where can you learn more? Go to http://adela-navdeep.info/. Enter J271 in the search box to learn more about \"Urinary Tract Infection in Women: Care Instructions. \"  Current as of: May 12, 2017  Content Version: 11.4  © 0891-3960 [employer]. Care instructions adapted under license by "[employer] (which disclaims liability or warranty for this information).  If you have questions about a medical condition or this instruction, always ask your healthcare professional. [address] any warranty or liability for your use of this information.

## 2018-05-15 LAB
BACTERIA SPEC CULT: ABNORMAL
SERVICE CMNT-IMP: ABNORMAL

## 2018-05-16 LAB
25(OH)D2 SERPL-MCNC: 1.2 NG/ML
25(OH)D3 SERPL-MCNC: 6 NG/ML
25(OH)D3+25(OH)D2 SERPL-MCNC: 7.2 NG/ML

## 2018-06-22 ENCOUNTER — TELEPHONE (OUTPATIENT)
Dept: INTERNAL MEDICINE CLINIC | Age: 28
End: 2018-06-22

## 2018-06-22 NOTE — LETTER
6/25/2018 12:47 PM 
 
Ms. Smith [address]  [id number] Re: Missed Appointment Dear Vee/Sir: 
 
I hope this letter finds you well. I am a Licensed Practical Nurse with [facility] and I have attempted to contact you by phone, but was unsuccessful. Your good health is important to us, that is why we are sending you this friendly reminder. As always, our goal is to be your partner in life-long wellness. According to our records you have missed your follow up appointment. Please contact our office at the number listed above to reschedule appointment. Sincerely, Alfonso Burch LPN

## 2018-06-23 NOTE — TELEPHONE ENCOUNTER
Pt needs to make an appt. With me  regarding her rhabdomyolysis and UTI. She was suppose to f/u after going to ER on 5/10/18.

## 2022-03-19 PROBLEM — F31.4 BIPOLAR 1 DISORDER, DEPRESSED, SEVERE (HCC): Status: ACTIVE | Noted: 2018-04-26
